# Patient Record
Sex: MALE | Race: WHITE | NOT HISPANIC OR LATINO | Employment: FULL TIME | URBAN - METROPOLITAN AREA
[De-identification: names, ages, dates, MRNs, and addresses within clinical notes are randomized per-mention and may not be internally consistent; named-entity substitution may affect disease eponyms.]

---

## 2020-03-14 ENCOUNTER — OFFICE VISIT (OUTPATIENT)
Dept: URGENT CARE | Facility: CLINIC | Age: 45
End: 2020-03-14
Payer: COMMERCIAL

## 2020-03-14 VITALS
DIASTOLIC BLOOD PRESSURE: 76 MMHG | SYSTOLIC BLOOD PRESSURE: 116 MMHG | RESPIRATION RATE: 18 BRPM | BODY MASS INDEX: 25.9 KG/M2 | OXYGEN SATURATION: 98 % | HEART RATE: 102 BPM | TEMPERATURE: 97.6 F | WEIGHT: 165 LBS | HEIGHT: 67 IN

## 2020-03-14 DIAGNOSIS — S39.012A LUMBAR STRAIN, INITIAL ENCOUNTER: Primary | ICD-10-CM

## 2020-03-14 PROCEDURE — G0382 LEV 3 HOSP TYPE B ED VISIT: HCPCS | Performed by: PHYSICIAN ASSISTANT

## 2020-03-14 PROCEDURE — S9083 URGENT CARE CENTER GLOBAL: HCPCS | Performed by: PHYSICIAN ASSISTANT

## 2020-03-14 RX ORDER — METHOCARBAMOL 500 MG/1
500 TABLET, FILM COATED ORAL 3 TIMES DAILY
Qty: 12 TABLET | Refills: 0 | Status: SHIPPED | OUTPATIENT
Start: 2020-03-14 | End: 2020-03-27 | Stop reason: DRUGHIGH

## 2020-03-14 RX ORDER — PREDNISONE 10 MG/1
TABLET ORAL
Qty: 21 TABLET | Refills: 0 | Status: SHIPPED | OUTPATIENT
Start: 2020-03-14 | End: 2020-03-27 | Stop reason: ALTCHOICE

## 2020-03-14 NOTE — LETTER
March 14, 2020     Patient: Aisha Banerjee   YOB: 1975   Date of Visit: 3/14/2020       To Whom it May Concern:    Aisha Banerjee was seen in my clinic on 3/14/2020  He may return to work on 3/17/2020      Sincerely,          Katrina Charles PA-C        CC: No Recipients

## 2020-03-14 NOTE — PROGRESS NOTES
[address] Now        NAME: Edouard Duarte is a 38 y o  male  : 1975    MRN: [id number]  DATE: 2020  TIME: 9:57 AM    Assessment and Plan   Lumbar strain, initial encounter [[id number]]  1  Lumbar strain, initial encounter  methocarbamol (ROBAXIN) 500 mg tablet    predniSONE 10 mg tablet         Patient Instructions     Take robaxin as directed, recommend no driving w/in 8 hours following taking medication  Take prednisone as directed  C/w heat, light stretching, and OTC icyhot  Follow up with PCP in 3-5 days  Proceed to  ER if symptoms worsen  Chief Complaint     Chief Complaint   Patient presents with    Back Pain     PMHx sciatic  On  experienced left lumbar muscle strain  Saw chiropractor yesterday - usually able to help - "poss made it worse this time " Last analgesic was 600mg Ibuprofen @ 0400  Pain is constant and now goes down back of LLE  History of Present Illness       Patient w/ PMH of sciatica presents with complaint of right-sided low back pain with radiation into his right leg since Wednesday  Pt reports that he reached into his car for something on  and felt pain in his right low back  Pt states that his low back pain has subsided but moved into his right buttock and down his leg  Pt reports taking 800 mg ibuprofen q 4 hours, going to the chiropractor yesterday, and applying heat  Pt states that he has been having difficulty sleeping as his pain is a constant aching  Pt denies paresthesias but for occasional numbness in one toe  He denies groin paresthesias and bowel/bladder incontinence  Review of Systems   Review of Systems   Constitutional: Negative for chills, fatigue and fever  HENT: Negative for congestion, ear pain, postnasal drip, rhinorrhea and sore throat  Respiratory: Negative for cough, chest tightness and shortness of breath  Cardiovascular: Negative for chest pain     Gastrointestinal: Negative for abdominal pain, blood in stool, diarrhea, nausea and vomiting  Musculoskeletal: Positive for back pain  Negative for myalgias, neck pain and neck stiffness  Skin: Negative for color change, rash and wound  Neurological: Negative for dizziness, weakness, light-headedness, numbness and headaches  All other systems reviewed and are negative  Current Medications       Current Outpatient Medications:     methocarbamol (ROBAXIN) 500 mg tablet, Take 1 tablet (500 mg total) by mouth 3 (three) times a day, Disp: 12 tablet, Rfl: 0    predniSONE 10 mg tablet, Take 6 tabs on day 1, 5 tabs on day 2, 4 tabs on day 3, 3 tabs on day 4, 2 tabs on day 5, and 1 tab on day 6, Disp: 21 tablet, Rfl: 0    Current Allergies     Allergies as of 03/14/2020    (No Known Allergies)            The following portions of the patient's history were reviewed and updated as appropriate: allergies, current medications, past family history, past medical history, past social history, past surgical history and problem list      History reviewed  No pertinent past medical history  History reviewed  No pertinent surgical history  History reviewed  No pertinent family history  Medications have been verified  Objective   /76   Pulse 102   Temp 97 6 °F (36 4 °C)   Resp 18   Ht 5' 7" (1 702 m)   Wt 74 8 kg (165 lb)   SpO2 98%   BMI 25 84 kg/m²        Physical Exam     Physical Exam   Constitutional: He is oriented to person, place, and time  He appears well-developed and well-nourished  No distress  HENT:   Head: Normocephalic and atraumatic  Eyes: Pupils are equal, round, and reactive to light  Conjunctivae and EOM are normal    Neck: Normal range of motion  Neck supple  Cardiovascular: Normal rate, regular rhythm and normal heart sounds  Pulmonary/Chest: Effort normal and breath sounds normal  No respiratory distress  Musculoskeletal: Normal range of motion  He exhibits tenderness     L-spine: no skin changes; FAROM; NTTP; TTP in right buttock; LE sensation intact; pain w/ resisted knee extension; normal gait; increased pain in leg w/ right SLR and right sitting root test   Lymphadenopathy:     He has no cervical adenopathy  Neurological: He is alert and oriented to person, place, and time  No cranial nerve deficit or sensory deficit  Skin: Skin is warm and dry  Capillary refill takes less than 2 seconds  No rash noted  He is not diaphoretic  Psychiatric: He has a normal mood and affect  His behavior is normal  Thought content normal    Nursing note and vitals reviewed

## 2020-03-21 ENCOUNTER — HOSPITAL ENCOUNTER (EMERGENCY)
Facility: HOSPITAL | Age: 45
Discharge: HOME | End: 2020-03-21
Attending: EMERGENCY MEDICINE | Admitting: EMERGENCY MEDICINE
Payer: COMMERCIAL

## 2020-03-21 VITALS
TEMPERATURE: 97.7 F | WEIGHT: 165 LBS | SYSTOLIC BLOOD PRESSURE: 166 MMHG | BODY MASS INDEX: 25.84 KG/M2 | RESPIRATION RATE: 20 BRPM | OXYGEN SATURATION: 99 % | HEART RATE: 83 BPM | DIASTOLIC BLOOD PRESSURE: 96 MMHG

## 2020-03-21 DIAGNOSIS — G89.29 ACUTE EXACERBATION OF CHRONIC LOW BACK PAIN: Primary | ICD-10-CM

## 2020-03-21 DIAGNOSIS — M54.50 ACUTE EXACERBATION OF CHRONIC LOW BACK PAIN: Primary | ICD-10-CM

## 2020-03-21 PROCEDURE — 99283 EMERGENCY DEPT VISIT LOW MDM: CPT

## 2020-03-21 PROCEDURE — 96372 THER/PROPH/DIAG INJ SC/IM: CPT

## 2020-03-21 PROCEDURE — 99284 EMERGENCY DEPT VISIT MOD MDM: CPT | Performed by: EMERGENCY MEDICINE

## 2020-03-21 RX ORDER — METHOCARBAMOL 500 MG/1
500 TABLET, FILM COATED ORAL 2 TIMES DAILY
Qty: 20 TABLET | Refills: 0 | Status: SHIPPED | OUTPATIENT
Start: 2020-03-21 | End: 2020-03-27 | Stop reason: SDUPTHER

## 2020-03-21 RX ORDER — IBUPROFEN 400 MG/1
TABLET ORAL EVERY 6 HOURS PRN
COMMUNITY
End: 2020-06-09

## 2020-03-21 RX ORDER — KETOROLAC TROMETHAMINE 30 MG/ML
15 INJECTION, SOLUTION INTRAMUSCULAR; INTRAVENOUS ONCE
Status: COMPLETED | OUTPATIENT
Start: 2020-03-21 | End: 2020-03-21

## 2020-03-21 RX ORDER — LIDOCAINE 50 MG/G
1 PATCH TOPICAL ONCE
Status: DISCONTINUED | OUTPATIENT
Start: 2020-03-21 | End: 2020-03-21 | Stop reason: HOSPADM

## 2020-03-21 RX ORDER — PREDNISONE 20 MG/1
40 TABLET ORAL DAILY
Qty: 10 TABLET | Refills: 0 | Status: SHIPPED | OUTPATIENT
Start: 2020-03-21 | End: 2020-03-27 | Stop reason: ALTCHOICE

## 2020-03-21 RX ORDER — ACETAMINOPHEN 500 MG
500 TABLET ORAL EVERY 6 HOURS PRN
COMMUNITY

## 2020-03-21 RX ORDER — DIAZEPAM 5 MG/1
5 TABLET ORAL ONCE
Status: COMPLETED | OUTPATIENT
Start: 2020-03-21 | End: 2020-03-21

## 2020-03-21 RX ADMIN — DIAZEPAM 5 MG: 5 TABLET ORAL at 09:17

## 2020-03-21 RX ADMIN — KETOROLAC TROMETHAMINE 15 MG: 30 INJECTION, SOLUTION INTRAMUSCULAR at 07:29

## 2020-03-21 RX ADMIN — LIDOCAINE 1 PATCH: 50 PATCH TOPICAL at 07:29

## 2020-03-21 RX ADMIN — DIAZEPAM 5 MG: 5 TABLET ORAL at 07:29

## 2020-03-21 NOTE — ED ATTENDING ATTESTATION
3/21/2020  IScot MD, saw and evaluated the patient  I have discussed the patient with the resident/non-physician practitioner and agree with the resident's/non-physician practitioner's findings, Plan of Care, and MDM as documented in the resident's/non-physician practitioner's note, except where noted  All available labs and Radiology studies were reviewed  I was present for key portions of any procedure(s) performed by the resident/non-physician practitioner and I was immediately available to provide assistance  At this point I agree with the current assessment done in the Emergency Department  I have conducted an independent evaluation of this patient a history and physical is as follows:    Patient presents to the emergency department complaining of an exacerbation of his chronic, longstanding lower back pain  The patient reports many years of lower back pain that is greater on the right than the left  The patient reports 2 weeks ago he reached to pick something up and developed pain in his right lower back that has radiated down his right leg  The patient was seen in urgent care but his pain persists  The patient was also seen by his chiropractor  The patient denies IV drug abuse, cancer, fevers, weakness, paresthesias, or loss of bowel or bladder function  Patient reports this is identical to exacerbations of pain he has had in the past   Physical exam demonstrates a male in no acute distress  HEENT exam is normal   Lungs are clear with equal breath sounds  The heart had a regular rate rhythm  The abdomen is soft and nontender  There was tenderness to the lumbar paraspinal muscles and a straight leg raise on the right was also positive  Left straight leg raise is negative  The patient had normal strength and sensation and deep tendon reflexes of both lower extremities    ED Course         Critical Care Time  Procedures

## 2020-03-21 NOTE — ED PROVIDER NOTES
History  Chief Complaint   Patient presents with    Back Pain     was seen at urgent care 1 week ago and was given methocarbamol and prednisone  Was at chiropractor yesterday and was unable to be adjusted, pain got worse as the night went on  This is a 47yo male with no significant PMHx who presents to the ED this morning with acute on chronic back pain  Patient states that he has had back problems on and off for the last 15 years  About two weeks ago he was reaching for something in his car and felt sudden, sharp pain in his R lower back  Patient went to his chiropractor but the pain did not improve  He went to urgent care a few days later and was given robaxin and prednisone, which he finished two days ago  The pain was still present so he went to his chiropractor again but again had no relief  He is coming in now after taking 1g of tylenol at home  Patient states the pain is in his R lower back and radiates down to his mid-shin  He also notes that his lateral toes are numb at times but not currently  He denies any trauma, saddle anesthesia, urinary retention, or fecal incontinence  He denies any history of IVDU and has never had any surgeries or procedures to his back  Patient denies any FCNVD, CP/SOB, abdominal pain, dysuria, hematuria, or focal neuro deficits currently  Prior to Admission Medications   Prescriptions Last Dose Informant Patient Reported?  Taking?   acetaminophen (TYLENOL) 500 mg tablet 3/21/2020 at Unknown time  Yes Yes   Sig: Take 500 mg by mouth every 6 (six) hours as needed for mild pain   ibuprofen (MOTRIN) 400 mg tablet 3/20/2020 at Unknown time  Yes Yes   Sig: Take by mouth every 6 (six) hours as needed for mild pain   methocarbamol (ROBAXIN) 500 mg tablet Past Week at Unknown time  No Yes   Sig: Take 1 tablet (500 mg total) by mouth 3 (three) times a day   predniSONE 10 mg tablet Past Week at Unknown time  No Yes   Sig: Take 6 tabs on day 1, 5 tabs on day 2, 4 tabs on day 3, 3 tabs on day 4, 2 tabs on day 5, and 1 tab on day 6      Facility-Administered Medications: None       History reviewed  No pertinent past medical history  History reviewed  No pertinent surgical history  History reviewed  No pertinent family history  I have reviewed and agree with the history as documented  E-Cigarette/Vaping    E-Cigarette Use Never User      E-Cigarette/Vaping Substances     Social History     Tobacco Use    Smoking status: Never Smoker    Smokeless tobacco: Never Used   Substance Use Topics    Alcohol use: Not Currently    Drug use: Not Currently        Review of Systems   Constitutional: Negative for chills, diaphoresis and fever  HENT: Negative for congestion, rhinorrhea, sinus pressure and sore throat  Eyes: Negative for visual disturbance  Respiratory: Negative for cough, chest tightness and shortness of breath  Cardiovascular: Negative for chest pain  Gastrointestinal: Negative for abdominal pain, constipation, diarrhea, nausea and vomiting  Genitourinary: Negative for dysuria, frequency, hematuria and urgency  Musculoskeletal: Positive for back pain  Negative for arthralgias and myalgias  Skin: Negative for color change and rash  Neurological: Negative for dizziness, numbness and headaches  Physical Exam  ED Triage Vitals [03/21/20 0653]   Temperature Pulse Respirations Blood Pressure SpO2   97 7 °F (36 5 °C) 93 18 153/95 98 %      Temp Source Heart Rate Source Patient Position - Orthostatic VS BP Location FiO2 (%)   Oral -- -- -- --      Pain Score       7             Orthostatic Vital Signs  Vitals:    03/21/20 0653 03/21/20 0924   BP: 153/95 166/96   Pulse: 93 83       Physical Exam   Constitutional: He is oriented to person, place, and time  He appears well-developed and well-nourished  No distress  HENT:   Head: Normocephalic and atraumatic  Eyes: Pupils are equal, round, and reactive to light   Conjunctivae and EOM are normal  Right eye exhibits no discharge  Left eye exhibits no discharge  No scleral icterus  Neck: Normal range of motion  Neck supple  No JVD present  Cardiovascular: Normal rate, regular rhythm and normal heart sounds  Exam reveals no gallop and no friction rub  No murmur heard  Pulmonary/Chest: Effort normal and breath sounds normal  No stridor  No respiratory distress  He has no wheezes  He has no rales  He exhibits no tenderness  Abdominal: Soft  Bowel sounds are normal  He exhibits no distension  There is no tenderness  There is no guarding  Musculoskeletal: Normal range of motion  He exhibits tenderness  He exhibits no edema or deformity  Pain in RLE with SLR  2+ patellar and achilles reflexes  Normal strength and sensation bilaterally    Mild tenderness of R lower back   Neurological: He is alert and oriented to person, place, and time  No cranial nerve deficit or sensory deficit  He exhibits normal muscle tone  Skin: Skin is warm and dry  No rash noted  He is not diaphoretic  No erythema  No pallor  Psychiatric: He has a normal mood and affect  His behavior is normal    Nursing note and vitals reviewed  ED Medications  Medications   lidocaine (LIDODERM) 5 % patch 1 patch (1 patch Topical Medication Applied 3/21/20 0729)   diazepam (VALIUM) tablet 5 mg (5 mg Oral Given 3/21/20 0729)   ketorolac (TORADOL) injection 15 mg (15 mg Intramuscular Given 3/21/20 0729)   diazepam (VALIUM) tablet 5 mg (5 mg Oral Given 3/21/20 5988)       Diagnostic Studies  Results Reviewed     None                 No orders to display         Procedures  Procedures      ED Course                                 MDM  Number of Diagnoses or Management Options  Acute exacerbation of chronic low back pain:   Diagnosis management comments: Patient states that he still is having some pain in the lower back  I do not believe imaging (CT/x-ray) would be helpful in this case as there was no trauma and my suspicion for fracture is low   I believe the patient has a need for an MRI but without any emergent indications, I will [facility] the [facility] and have him follow up with [facility] and or the [facility] for PCP follow up for outpatient MRI  Patient was able to ambulate around the ED room without assistance  Will prescribe him some more robaxin as he states that worked well as well as prednisone  Patient instructed to return to the ED for any worsening or otherwise concerning symptoms  Disposition  Final diagnoses:   Acute exacerbation of chronic low back pain     Time reflects when diagnosis was documented in both MDM as applicable and the Disposition within this note     Time User Action Codes Description Comment    3/21/2020  9:49 AM Arleen Pearce Add [M54 5,  G89 29] Acute exacerbation of chronic low back pain       ED Disposition     ED Disposition Condition Date/Time Comment    Discharge Stable Sat Mar 21, 2020  9:49 AM Rosie Rubi discharge to home/self care              Follow-up Information     Follow up With Specialties Details Why Contact Info Additional [address] Emergency Department Emergency Medicine  If symptoms worsen [address]  [phone]  ED, [address] [place] [address], [address] Internal Medicine Schedule an appointment as soon as possible for a visit   [address] [address]  [address] [place] [address], [address], [address] [phone]    [facility]  Call   [phone]             Discharge Medication List as of 3/21/2020  9:56 AM      START taking these medications    Details   !! methocarbamol (ROBAXIN) 500 mg tablet Take 1 tablet (500 mg total) by mouth 2 (two) times a day, Starting Sat 3/21/2020, Print      !! predniSONE 20 mg tablet Take 2 tablets (40 mg total) by mouth daily for 5 days, Starting Sat 3/21/2020, Until Thu 3/26/2020, Print       !! - Potential duplicate medications found  Please discuss with provider  CONTINUE these medications which have NOT CHANGED    Details   acetaminophen (TYLENOL) 500 mg tablet Take 500 mg by mouth every 6 (six) hours as needed for mild pain, Historical Med      ibuprofen (MOTRIN) 400 mg tablet Take by mouth every 6 (six) hours as needed for mild pain, Historical Med      !! methocarbamol (ROBAXIN) 500 mg tablet Take 1 tablet (500 mg total) by mouth 3 (three) times a day, Starting Sat 3/14/2020, Normal      !! predniSONE 10 mg tablet Take 6 tabs on day 1, 5 tabs on day 2, 4 tabs on day 3, 3 tabs on day 4, 2 tabs on day 5, and 1 tab on day 6, Normal       !! - Potential duplicate medications found  Please discuss with provider  PDMP Review     None           ED Provider  Attending physically available and evaluated Kim Bower  I managed the patient along with the ED Attending      Electronically Signed by         Jeanne Ann MD  03/21/20 0961

## 2020-03-21 NOTE — DISCHARGE INSTRUCTIONS
Please follow-up with the [facility] and or info link numbers below in order to find a primary care physician  Contact the [facility] at the contact information below if you do not hear from them by Tuesday  Return to the emergency department for any worsening or otherwise concerning symptoms

## 2020-03-23 ENCOUNTER — NURSE TRIAGE (OUTPATIENT)
Dept: PHYSICAL THERAPY | Facility: OTHER | Age: 45
End: 2020-03-23

## 2020-03-23 DIAGNOSIS — M54.41 ACUTE RIGHT-SIDED LOW BACK PAIN WITH RIGHT-SIDED SCIATICA: Primary | ICD-10-CM

## 2020-03-23 NOTE — TELEPHONE ENCOUNTER
Additional Information   Negative: Are you currently recieving homecare services?  Negative: Is this related to an MVA?  Negative: Is this related to a work injury?  Negative: Is the patient experiencing acute drop foot or paralysis?  Negative: Is the patient experiencing urine retention?  Negative: Does the patient have a fever?  Negative: Has the patient had unexplained weight loss?  Negative: Is the patient experiencing blood in sputum?  Negative: Is this a chronic condition?  Negative: Has the patient experienced major trauma? (fall from height, high speed collision, direct blow to spine) and is also experiencing nausea, light-headedness, or loss of consciousness? Background - Initial Assessment  Clinical complaint: Acute right low back pain, radiates down lef to knee and calf  Pt has had numbness off/on in the toes on the right foot  Pt stated that he has had thi in the past, and his back feels tight, saw his Chiropractor, and is on Robaxin, steroids, Tylenol, Motrin  Pt stated that he feels that being "adjusted" by Chiropractor is making worse  Pt is using heat to his back, no neck or back surgeries, no specialist for his back  Pt is in and out of a truck all day, and on his fet too  Date of onset:2 weeks  Frequency of pain:  INTERMITTENT  Quality of pain: Pt stated My whole right leg "feels like someone is pulling my leg out"    Protocols used: [facility] COMPREHENSIVE SPINE PROGRAM PROTOCOL    This nurse did review in detail the comp spine program and what we can provide for the pt for their back pain  Pt is agreeable to being triaged by this nurse and would like to have physical therapy  Referrals were placed to the following:  Physical Therapy at the [facility]  site  Pt was given ph # to that site  No further questions voiced at this time and Pt did state understanding of the referral that was placed

## 2020-03-26 ENCOUNTER — EVALUATION (OUTPATIENT)
Dept: PHYSICAL THERAPY | Facility: REHABILITATION | Age: 45
End: 2020-03-26
Payer: COMMERCIAL

## 2020-03-26 VITALS — SYSTOLIC BLOOD PRESSURE: 148 MMHG | HEART RATE: 64 BPM | DIASTOLIC BLOOD PRESSURE: 98 MMHG | TEMPERATURE: 99.4 F

## 2020-03-26 DIAGNOSIS — M54.41 ACUTE RIGHT-SIDED LOW BACK PAIN WITH RIGHT-SIDED SCIATICA: Primary | ICD-10-CM

## 2020-03-26 PROCEDURE — 97161 PT EVAL LOW COMPLEX 20 MIN: CPT | Performed by: PHYSICAL THERAPIST

## 2020-03-26 PROCEDURE — 97140 MANUAL THERAPY 1/> REGIONS: CPT | Performed by: PHYSICAL THERAPIST

## 2020-03-26 NOTE — PROGRESS NOTES
PT Evaluation     Today's date: 3/26/2020  Patient name: Kisha Denson  : 1975  MRN: [id number]  Referring provider: Huber Naranjo PT  Dx:   Encounter Diagnosis     ICD-10-CM    1  Acute right-sided low back pain with right-sided sciatica M54 41 Ambulatory referral to PT spine                  Assessment  Assessment details: Patient presents with pain, decreased lumbar ROM, decreased strength, adverse neural tension, R LE weight-bearing intolerance and decreased function secondary to acute LBP with R sided sciatica  Pt continues to present with significant radicular pain despite taking 2 rounds of tapering dose of steroids and muscle relaxers  Pt is not currently presenting with a directional preference  Pt did experience some mild relief with R LE LAD  Patient would benefit from skilled PT intervention to address these issues and to maximize function  Thank you for the referral   Pt will also be referred to Pain Management to discuss other options to manage his pain, as he does not have a PCP currently  Impairments: abnormal gait, abnormal or restricted ROM, activity intolerance, impaired physical strength, lacks appropriate home exercise program, pain with function, weight-bearing intolerance and poor posture   Understanding of Dx/Px/POC: good   Prognosis: good    Goals  Short Term:  Pt will report decreased levels of pain by at least 2 subjective ratings in 4 weeks  Pt will demonstrate improved ROM by at least 25% in 4 weeks  Pt will demonstrate improved strength by 1/2 grade MMT in 4 weeks  Long Term:   Pt will be independent in their HEP in 8 weeks  Pt will demonstrate improved FOTO, > 59  Pt will be independent with all ADL's  Pt will return to work at prior level of function  Pt will resume recreational activity at prior/maximal level of function    Plan  Plan details: Patient was educated in [address] [place] [address] and Plan of Care  All questions were answered to pt's satisfaction      Patient would benefit from: skilled physical therapy  Referral necessary: Yes  Planned modality interventions: thermotherapy: hydrocollator packs and TENS  Planned therapy interventions: manual therapy, joint mobilization, abdominal trunk stabilization, body mechanics training, neuromuscular re-education, postural training, patient education, therapeutic activities, therapeutic exercise, flexibility, home exercise program and transfer training  Frequency: 2x week  Duration in weeks: 6  Plan of Care beginning date: 3/26/2020  Plan of Care expiration date: 2020  Treatment plan discussed with: patient and family        Subjective Evaluation    History of Present Illness  Mechanism of injury: Pt is a 38 y o male with an acute onset of R sided LBP with R LE radicular symptoms  Pt bent over into his car to grab his jacket on 3/11 and felt a pinch on R lumbar spine and c/o tightness  Pt worked the remainder of the day (construction flagging)  Pt saw Chiropractor 2 days later, but symptoms worsened  Pt then went to Urgent care and was given prednisone and muscle relaxer  Pt returned to work the following Friday and symptoms increased  Pt returned to Chiropractor, but again his pain increased  Pt went to the ED the next day and was prescribed prednisone and muscle relaxer again  Pt does have a history of LBP, intermittently for 15 years prior to this episode  Pt reports pain/diffiuclty with dressing (wife assists with donning shoes), self care in bathroom (has to wipe with left hand), sleep (can only lay supine and does not tolerate for prolonged periods), household activities (has been unable to perform), standing (limited tolerance), ambulation (antalgic gait and limited tolerance), sitting (limited tolerance)  Pt is unable to drive  Pt is out of work currently also  Pt would like to resume playing guitar, home projects      Pain  At best pain rating: 3  At worst pain ratin  Location: lumbar spine and radiating down R LE anterior aspect to mid shin  Quality: dull ache  Relieving factors: medications and heat      Diagnostic Tests  No diagnostic tests performed  Treatments  Current treatment: chiropractic and medication  Patient Goals  Patient goals for therapy: decreased pain, return to work, independence with ADLs/IADLs, return to sport/leisure activities, increased motion and increased strength          Objective     Concurrent Complaints      Additional Special Questions  Patient denies loss of bowel/bladder control, saddle anesthesia, urine retention, unexplained weight loss, history of cancer  Tenderness     Additional Tenderness Details  No significant TTP noted  Neurological Testing     Reflexes   Left   Patellar (L4): normal (2+)  Achilles (S1): normal (2+)  Clonus sign: negative    Right   Patellar (L4): normal (2+)  Achilles (S1): normal (2+)  Clonus sign: negative    Additional Neurological Details  Pt reports intermittent N/T R shin and R foot    Active Range of Motion     Additional Active Range of Motion Details  L/S AROM (% of normal):  Flex=wfl with pain radiating t/o R LE ; repeated=no change  Ext=25% with pain radiating t/o R LE ; repeated=no change  RSB= 25% with increased pain in same region; repeated=no change  LSB=wfl without pain ; repeated=same R LE pain returns  R rot=75% with some pain same region  L rot=wfl without pain    Strength/Myotome Testing     Left Hip   Planes of Motion   Flexion: 5  Abduction: 4+  External rotation: 4+    Right Hip   Planes of Motion   Flexion: 3-  Abduction: 4  External rotation: 4    Left Knee   Flexion: 5  Extension: 5    Right Knee   Flexion: 5  Extension: 4+    Left Ankle/Foot   Dorsiflexion: 5  Plantar flexion: 5 (seated)  Great toe extension: 5    Right Ankle/Foot   Dorsiflexion: 5  Plantar flexion: 5 (seated)  Great toe extension: 5    Tests     Lumbar   Positive prone instability   Negative SIJ compression  Left   Positive crossed SLR     Negative femoral stretch and passive SLR  Right   Positive femoral stretch, passive SLR and slump test    Negative crossed SLR  Left Pelvic Girdle/Sacrum   Negative: active SLR test      Right Pelvic Girdle/Sacrum   Positive: active SLR test      Left Hip   Negative FELICIANO  Right Hip   Negative FELICIANO  Additional Tests Details  Long axis distraction R LE=some relief  R sciatic nerve gliders=some relief  R femoral nerve glides=some relief initially and then increased pain  JAG and press ups=increased ROM with repetition and initially causes increased LBP, but then notes increased R LE pain radiating to his knee upon release  DKTC=increased R LE pain               Precautions: Acute on chronic pain      Manual  3/26            R LE LAD 60"x5            R sciatic nerve gliders 2x15            R femoral nerve glides 2x10            L/S FABRIZIO WALLACE's and ROSANNE PONCE's                              Exercise Diary              Bike or Nustep             R side glides             TA contraction             TA w/march             TA w/kicks             TA w/hip adduction squeeze             TA w/clamshells             TA w/leg lowering                                                                                                                                                                             Modalities              MH PRN             TENS PRN

## 2020-03-27 ENCOUNTER — TELEMEDICINE (OUTPATIENT)
Dept: FAMILY MEDICINE CLINIC | Facility: CLINIC | Age: 45
End: 2020-03-27
Payer: COMMERCIAL

## 2020-03-27 DIAGNOSIS — M54.41 ACUTE BILATERAL LOW BACK PAIN WITH RIGHT-SIDED SCIATICA: Primary | ICD-10-CM

## 2020-03-27 PROCEDURE — 99213 OFFICE O/P EST LOW 20 MIN: CPT | Performed by: PHYSICIAN ASSISTANT

## 2020-03-27 RX ORDER — METHOCARBAMOL 750 MG/1
750 TABLET, FILM COATED ORAL EVERY 6 HOURS PRN
Qty: 60 TABLET | Refills: 0 | Status: SHIPPED | OUTPATIENT
Start: 2020-03-27 | End: 2020-09-08

## 2020-03-27 NOTE — LETTER
March 27, 2020     Patient: Gigi Mccormick   YOB: 1975   Date of Visit: 3/27/2020       To Whom it May Concern:    Gigi Mccormick is under my professional care  He was seen in my office on 3/27/2020  He may return to work on 4/6  If you have any questions or concerns, please don't hesitate to call           Sincerely,          Stacia Hurley PA-C        CC: No Recipients

## 2020-03-27 NOTE — PROGRESS NOTES
Virtual Regular Visit    Problem List Items Addressed This Visit     None      Visit Diagnoses     Acute bilateral low back pain with right-sided sciatica    -  Primary    Relevant Medications    methocarbamol (ROBAXIN) 750 mg tablet    Other Relevant Orders    Ambulatory referral to Pain Management               Reason for visit is   Low back pain for 2-3 weeks  Encounter provider Donald Howell PA-C    Provider located at [facility]  [address]  [facility]  [phone]      Recent Visits  No visits were found meeting these conditions  Showing recent visits within past 7 days and meeting all other requirements     Today's Visits  Date Type Provider Dept   [facility] [address] [facility] today's visits and meeting all other requirements     Future Appointments  No visits were found meeting these conditions  Showing future appointments within next 150 days and meeting all other requirements        After connecting through [employer], the patient was identified by name and date of birth  Jocelyn Hopson was informed that this is a telemedicine visit and that the visit is being conducted through [employer] and patient was informed that this is not a secure, HIPAA-complaint platform  he agrees to proceed  which may not be secure and therefore, might not be HIPAA-compliant  My office door was closed  No one else was in the room  He acknowledged consent and understanding of privacy and security of the video platform  The patient has agreed to participate and understands they can discontinue the visit at any time  Subjective  Joceyln Hopson is a 38 y o  male  complaining of low back pain for 2-3 weeks  States it started after a bent into his car to grab something  States it is sharp and severe, radiated down right leg  He has had low back pain since then  Bilateral, mostly in the center    Still radiates down right leg  He describes the pain as achy and shooting  States it "feels out of socket"  His muscles feel tense  Admits to intermittent numbness and tingling, mostly of his anterior shin and some toes  Severity varies  At its worst it has been 9/10  At best 2/10  Currently 2/10  Pain worsens with movement  He did go to the urgent care on  3/14 and was prescribed prednisone and methocarbamol  3/21 was seen in the ER and prescribed another course of prednisone and methocarbamol  Finish prednisone 3 days ago  Currently taking methocarbamol, Tylenol, and ibuprofen with some mild relief  Denies any other associated symptoms  Denies nausea, vomiting, diarrhea, or constipation  Denies abdominal pain  Denies any changes in urination  Denies dysuria, frequency, and urgency  Denies fever or chills  He does admit to chronic back pain previously  It would flare up from time to time  He would usually go to his chiropractor and it would improve/ resolve  Past Medical History:   Diagnosis Date    Hypertension        History reviewed  No pertinent surgical history  Current Outpatient Medications   Medication Sig Dispense Refill    acetaminophen (TYLENOL) 500 mg tablet Take 500 mg by mouth every 6 (six) hours as needed for mild pain      ibuprofen (MOTRIN) 400 mg tablet Take by mouth every 6 (six) hours as needed for mild pain      methocarbamol (ROBAXIN) 750 mg tablet Take 1 tablet (750 mg total) by mouth every 6 (six) hours as needed for muscle spasms 60 tablet 0     No current facility-administered medications for this visit  No Known Allergies    Review of Systems   Constitutional: Negative for chills, fatigue and fever  HENT: Negative for congestion, ear pain, postnasal drip, rhinorrhea and sore throat  Respiratory: Negative for cough and shortness of breath  Cardiovascular: Negative for chest pain, palpitations and leg swelling     Gastrointestinal: Negative for abdominal pain, constipation, diarrhea, nausea and vomiting  Genitourinary: Negative for decreased urine volume, difficulty urinating, dysuria, frequency, hematuria and urgency  Musculoskeletal: Positive for back pain  Negative for arthralgias, gait problem, joint swelling, myalgias, neck pain and neck stiffness  Skin: Negative for rash  Neurological: Positive for numbness  Negative for dizziness, tremors, seizures, syncope, weakness, light-headedness and headaches  Hematological: Negative for adenopathy  Physical Exam   Constitutional: He is oriented to person, place, and time  He appears well-developed and well-nourished  HENT:   Head: Normocephalic  Eyes: EOM are normal    Neck: Normal range of motion  Pulmonary/Chest: Effort normal  No respiratory distress  Musculoskeletal: Normal range of motion  Neurological: He is alert and oriented to person, place, and time  Psychiatric: He has a normal mood and affect  His behavior is normal  Judgment and thought content normal         Assessment:  Likely musculoskeletal   Recommended following up with pain management  Will place referral   Patient already scheduled 4/1 with Dr Alea Gleason  In the meantime I will increase methocarbamol to 750 mg  Take 3 times daily as needed  Patient aware of possible side effects  Continue Tylenol and ibuprofen as needed with food  Warm/cool compresses for 15-20 minute intervals  Stretch daily and strengthen core  Use good body mechanics  Call office if symptoms worsen or fail to improve  Patient does also have a history of hypertension, previously diet controlled  Recommended scheduling office visit in the future for blood pressure follow-up and recheck  I spent 25 minutes with the patient during this visit

## 2020-04-01 ENCOUNTER — EVALUATION (OUTPATIENT)
Dept: PHYSICAL THERAPY | Age: 45
End: 2020-04-01
Payer: COMMERCIAL

## 2020-04-01 ENCOUNTER — TELEMEDICINE (OUTPATIENT)
Dept: PAIN MEDICINE | Facility: CLINIC | Age: 45
End: 2020-04-01
Payer: COMMERCIAL

## 2020-04-01 DIAGNOSIS — M54.41 ACUTE RIGHT-SIDED LOW BACK PAIN WITH RIGHT-SIDED SCIATICA: Primary | ICD-10-CM

## 2020-04-01 DIAGNOSIS — M54.41 ACUTE RIGHT-SIDED LOW BACK PAIN WITH RIGHT-SIDED SCIATICA: ICD-10-CM

## 2020-04-01 DIAGNOSIS — M54.16 LUMBAR RADICULOPATHY: Primary | ICD-10-CM

## 2020-04-01 PROCEDURE — G0283 ELEC STIM OTHER THAN WOUND: HCPCS | Performed by: PHYSICAL THERAPIST

## 2020-04-01 PROCEDURE — 97110 THERAPEUTIC EXERCISES: CPT | Performed by: PHYSICAL THERAPIST

## 2020-04-01 PROCEDURE — 97140 MANUAL THERAPY 1/> REGIONS: CPT | Performed by: PHYSICAL THERAPIST

## 2020-04-01 PROCEDURE — 97014 ELECTRIC STIMULATION THERAPY: CPT | Performed by: PHYSICAL THERAPIST

## 2020-04-01 PROCEDURE — 99203 OFFICE O/P NEW LOW 30 MIN: CPT | Performed by: ANESTHESIOLOGY

## 2020-04-01 RX ORDER — GABAPENTIN 300 MG/1
300 CAPSULE ORAL 3 TIMES DAILY
Qty: 90 CAPSULE | Refills: 1 | Status: SHIPPED | OUTPATIENT
Start: 2020-04-01 | End: 2020-05-12 | Stop reason: SDUPTHER

## 2020-04-06 ENCOUNTER — OFFICE VISIT (OUTPATIENT)
Dept: PHYSICAL THERAPY | Age: 45
End: 2020-04-06
Payer: COMMERCIAL

## 2020-04-06 DIAGNOSIS — M54.41 ACUTE RIGHT-SIDED LOW BACK PAIN WITH RIGHT-SIDED SCIATICA: Primary | ICD-10-CM

## 2020-04-06 PROCEDURE — 97014 ELECTRIC STIMULATION THERAPY: CPT | Performed by: PHYSICAL THERAPIST

## 2020-04-06 PROCEDURE — 97110 THERAPEUTIC EXERCISES: CPT | Performed by: PHYSICAL THERAPIST

## 2020-04-06 PROCEDURE — 97140 MANUAL THERAPY 1/> REGIONS: CPT | Performed by: PHYSICAL THERAPIST

## 2020-04-06 PROCEDURE — G0283 ELEC STIM OTHER THAN WOUND: HCPCS | Performed by: PHYSICAL THERAPIST

## 2020-04-08 ENCOUNTER — OFFICE VISIT (OUTPATIENT)
Dept: PHYSICAL THERAPY | Age: 45
End: 2020-04-08
Payer: COMMERCIAL

## 2020-04-08 DIAGNOSIS — M54.41 ACUTE RIGHT-SIDED LOW BACK PAIN WITH RIGHT-SIDED SCIATICA: Primary | ICD-10-CM

## 2020-04-08 PROCEDURE — G0283 ELEC STIM OTHER THAN WOUND: HCPCS | Performed by: PHYSICAL THERAPIST

## 2020-04-08 PROCEDURE — 97014 ELECTRIC STIMULATION THERAPY: CPT | Performed by: PHYSICAL THERAPIST

## 2020-04-08 PROCEDURE — 97140 MANUAL THERAPY 1/> REGIONS: CPT | Performed by: PHYSICAL THERAPIST

## 2020-04-08 PROCEDURE — 97110 THERAPEUTIC EXERCISES: CPT | Performed by: PHYSICAL THERAPIST

## 2020-04-09 ENCOUNTER — TRANSCRIBE ORDERS (OUTPATIENT)
Dept: RADIOLOGY | Facility: HOSPITAL | Age: 45
End: 2020-04-09

## 2020-04-09 ENCOUNTER — TELEPHONE (OUTPATIENT)
Dept: PAIN MEDICINE | Facility: CLINIC | Age: 45
End: 2020-04-09

## 2020-04-09 ENCOUNTER — HOSPITAL ENCOUNTER (OUTPATIENT)
Dept: RADIOLOGY | Facility: HOSPITAL | Age: 45
Discharge: HOME | End: 2020-04-09
Attending: ANESTHESIOLOGY
Payer: COMMERCIAL

## 2020-04-09 DIAGNOSIS — M54.16 LUMBAR RADICULOPATHY: ICD-10-CM

## 2020-04-09 DIAGNOSIS — M43.16 SPONDYLOLISTHESIS OF LUMBAR REGION: Primary | ICD-10-CM

## 2020-04-09 PROCEDURE — 72100 X-RAY EXAM L-S SPINE 2/3 VWS: CPT

## 2020-04-13 ENCOUNTER — OFFICE VISIT (OUTPATIENT)
Dept: PHYSICAL THERAPY | Age: 45
End: 2020-04-13
Payer: COMMERCIAL

## 2020-04-13 ENCOUNTER — APPOINTMENT (OUTPATIENT)
Dept: RADIOLOGY | Age: 45
End: 2020-04-13
Payer: COMMERCIAL

## 2020-04-13 DIAGNOSIS — M43.16 SPONDYLOLISTHESIS OF LUMBAR REGION: ICD-10-CM

## 2020-04-13 DIAGNOSIS — M54.41 ACUTE RIGHT-SIDED LOW BACK PAIN WITH RIGHT-SIDED SCIATICA: Primary | ICD-10-CM

## 2020-04-13 PROCEDURE — G0283 ELEC STIM OTHER THAN WOUND: HCPCS | Performed by: PHYSICAL THERAPIST

## 2020-04-13 PROCEDURE — 97110 THERAPEUTIC EXERCISES: CPT | Performed by: PHYSICAL THERAPIST

## 2020-04-13 PROCEDURE — 97140 MANUAL THERAPY 1/> REGIONS: CPT | Performed by: PHYSICAL THERAPIST

## 2020-04-13 PROCEDURE — 72114 X-RAY EXAM L-S SPINE BENDING: CPT

## 2020-04-13 PROCEDURE — 97014 ELECTRIC STIMULATION THERAPY: CPT | Performed by: PHYSICAL THERAPIST

## 2020-04-15 ENCOUNTER — OFFICE VISIT (OUTPATIENT)
Dept: PHYSICAL THERAPY | Age: 45
End: 2020-04-15
Payer: COMMERCIAL

## 2020-04-15 ENCOUNTER — TELEPHONE (OUTPATIENT)
Dept: PAIN MEDICINE | Facility: CLINIC | Age: 45
End: 2020-04-15

## 2020-04-15 DIAGNOSIS — M54.16 LUMBAR RADICULOPATHY: Primary | ICD-10-CM

## 2020-04-15 DIAGNOSIS — M54.41 ACUTE RIGHT-SIDED LOW BACK PAIN WITH RIGHT-SIDED SCIATICA: Primary | ICD-10-CM

## 2020-04-15 DIAGNOSIS — M54.41 ACUTE RIGHT-SIDED LOW BACK PAIN WITH RIGHT-SIDED SCIATICA: ICD-10-CM

## 2020-04-15 PROCEDURE — 97014 ELECTRIC STIMULATION THERAPY: CPT | Performed by: PHYSICAL THERAPIST

## 2020-04-15 PROCEDURE — 97012 MECHANICAL TRACTION THERAPY: CPT | Performed by: PHYSICAL THERAPIST

## 2020-04-15 PROCEDURE — 97110 THERAPEUTIC EXERCISES: CPT | Performed by: PHYSICAL THERAPIST

## 2020-04-15 PROCEDURE — G0283 ELEC STIM OTHER THAN WOUND: HCPCS | Performed by: PHYSICAL THERAPIST

## 2020-04-20 ENCOUNTER — OFFICE VISIT (OUTPATIENT)
Dept: PHYSICAL THERAPY | Age: 45
End: 2020-04-20
Payer: COMMERCIAL

## 2020-04-20 DIAGNOSIS — M54.41 ACUTE RIGHT-SIDED LOW BACK PAIN WITH RIGHT-SIDED SCIATICA: Primary | ICD-10-CM

## 2020-04-20 PROCEDURE — 97110 THERAPEUTIC EXERCISES: CPT | Performed by: PHYSICAL THERAPIST

## 2020-04-20 PROCEDURE — 97140 MANUAL THERAPY 1/> REGIONS: CPT | Performed by: PHYSICAL THERAPIST

## 2020-04-20 PROCEDURE — 97012 MECHANICAL TRACTION THERAPY: CPT | Performed by: PHYSICAL THERAPIST

## 2020-04-22 ENCOUNTER — OFFICE VISIT (OUTPATIENT)
Dept: PHYSICAL THERAPY | Age: 45
End: 2020-04-22
Payer: COMMERCIAL

## 2020-04-22 DIAGNOSIS — M54.41 ACUTE RIGHT-SIDED LOW BACK PAIN WITH RIGHT-SIDED SCIATICA: Primary | ICD-10-CM

## 2020-04-22 PROCEDURE — 97012 MECHANICAL TRACTION THERAPY: CPT | Performed by: PHYSICAL THERAPIST

## 2020-04-22 PROCEDURE — 97110 THERAPEUTIC EXERCISES: CPT | Performed by: PHYSICAL THERAPIST

## 2020-04-22 PROCEDURE — 97140 MANUAL THERAPY 1/> REGIONS: CPT | Performed by: PHYSICAL THERAPIST

## 2020-04-27 ENCOUNTER — OFFICE VISIT (OUTPATIENT)
Dept: PHYSICAL THERAPY | Age: 45
End: 2020-04-27
Payer: COMMERCIAL

## 2020-04-27 DIAGNOSIS — M54.41 ACUTE RIGHT-SIDED LOW BACK PAIN WITH RIGHT-SIDED SCIATICA: Primary | ICD-10-CM

## 2020-04-27 PROCEDURE — 97014 ELECTRIC STIMULATION THERAPY: CPT | Performed by: PHYSICAL THERAPIST

## 2020-04-27 PROCEDURE — 97110 THERAPEUTIC EXERCISES: CPT | Performed by: PHYSICAL THERAPIST

## 2020-04-27 PROCEDURE — 97140 MANUAL THERAPY 1/> REGIONS: CPT | Performed by: PHYSICAL THERAPIST

## 2020-04-27 PROCEDURE — G0283 ELEC STIM OTHER THAN WOUND: HCPCS | Performed by: PHYSICAL THERAPIST

## 2020-04-27 PROCEDURE — 97012 MECHANICAL TRACTION THERAPY: CPT | Performed by: PHYSICAL THERAPIST

## 2020-04-29 ENCOUNTER — OFFICE VISIT (OUTPATIENT)
Dept: PHYSICAL THERAPY | Age: 45
End: 2020-04-29
Payer: COMMERCIAL

## 2020-04-29 DIAGNOSIS — M54.41 ACUTE RIGHT-SIDED LOW BACK PAIN WITH RIGHT-SIDED SCIATICA: Primary | ICD-10-CM

## 2020-04-29 PROCEDURE — 97014 ELECTRIC STIMULATION THERAPY: CPT | Performed by: PHYSICAL THERAPIST

## 2020-04-29 PROCEDURE — G0283 ELEC STIM OTHER THAN WOUND: HCPCS | Performed by: PHYSICAL THERAPIST

## 2020-04-29 PROCEDURE — 97012 MECHANICAL TRACTION THERAPY: CPT | Performed by: PHYSICAL THERAPIST

## 2020-04-29 PROCEDURE — 97110 THERAPEUTIC EXERCISES: CPT | Performed by: PHYSICAL THERAPIST

## 2020-04-30 ENCOUNTER — TELEPHONE (OUTPATIENT)
Dept: PAIN MEDICINE | Facility: CLINIC | Age: 45
End: 2020-04-30

## 2020-05-04 ENCOUNTER — EVALUATION (OUTPATIENT)
Dept: PHYSICAL THERAPY | Age: 45
End: 2020-05-04
Payer: COMMERCIAL

## 2020-05-04 DIAGNOSIS — M54.41 ACUTE RIGHT-SIDED LOW BACK PAIN WITH RIGHT-SIDED SCIATICA: Primary | ICD-10-CM

## 2020-05-04 PROCEDURE — 97140 MANUAL THERAPY 1/> REGIONS: CPT | Performed by: PHYSICAL THERAPIST

## 2020-05-04 PROCEDURE — 97110 THERAPEUTIC EXERCISES: CPT | Performed by: PHYSICAL THERAPIST

## 2020-05-04 PROCEDURE — 97014 ELECTRIC STIMULATION THERAPY: CPT | Performed by: PHYSICAL THERAPIST

## 2020-05-04 PROCEDURE — 97012 MECHANICAL TRACTION THERAPY: CPT | Performed by: PHYSICAL THERAPIST

## 2020-05-04 PROCEDURE — G0283 ELEC STIM OTHER THAN WOUND: HCPCS | Performed by: PHYSICAL THERAPIST

## 2020-05-06 ENCOUNTER — EVALUATION (OUTPATIENT)
Dept: PHYSICAL THERAPY | Age: 45
End: 2020-05-06
Payer: COMMERCIAL

## 2020-05-06 DIAGNOSIS — M54.41 ACUTE RIGHT-SIDED LOW BACK PAIN WITH RIGHT-SIDED SCIATICA: Primary | ICD-10-CM

## 2020-05-06 PROCEDURE — G0283 ELEC STIM OTHER THAN WOUND: HCPCS | Performed by: PHYSICAL THERAPIST

## 2020-05-06 PROCEDURE — 97140 MANUAL THERAPY 1/> REGIONS: CPT | Performed by: PHYSICAL THERAPIST

## 2020-05-06 PROCEDURE — 97014 ELECTRIC STIMULATION THERAPY: CPT | Performed by: PHYSICAL THERAPIST

## 2020-05-06 PROCEDURE — 97012 MECHANICAL TRACTION THERAPY: CPT | Performed by: PHYSICAL THERAPIST

## 2020-05-06 PROCEDURE — 97110 THERAPEUTIC EXERCISES: CPT | Performed by: PHYSICAL THERAPIST

## 2020-05-11 ENCOUNTER — OFFICE VISIT (OUTPATIENT)
Dept: PHYSICAL THERAPY | Age: 45
End: 2020-05-11
Payer: COMMERCIAL

## 2020-05-11 DIAGNOSIS — M54.41 ACUTE RIGHT-SIDED LOW BACK PAIN WITH RIGHT-SIDED SCIATICA: Primary | ICD-10-CM

## 2020-05-11 PROCEDURE — 97014 ELECTRIC STIMULATION THERAPY: CPT | Performed by: PHYSICAL THERAPIST

## 2020-05-11 PROCEDURE — 97012 MECHANICAL TRACTION THERAPY: CPT | Performed by: PHYSICAL THERAPIST

## 2020-05-11 PROCEDURE — G0283 ELEC STIM OTHER THAN WOUND: HCPCS | Performed by: PHYSICAL THERAPIST

## 2020-05-11 PROCEDURE — 97140 MANUAL THERAPY 1/> REGIONS: CPT | Performed by: PHYSICAL THERAPIST

## 2020-05-11 PROCEDURE — 97110 THERAPEUTIC EXERCISES: CPT | Performed by: PHYSICAL THERAPIST

## 2020-05-12 ENCOUNTER — TELEPHONE (OUTPATIENT)
Dept: PAIN MEDICINE | Facility: CLINIC | Age: 45
End: 2020-05-12

## 2020-05-12 ENCOUNTER — TELEMEDICINE (OUTPATIENT)
Dept: PAIN MEDICINE | Facility: CLINIC | Age: 45
End: 2020-05-12
Payer: COMMERCIAL

## 2020-05-12 DIAGNOSIS — M54.16 LUMBAR RADICULOPATHY: Primary | ICD-10-CM

## 2020-05-12 PROCEDURE — 99214 OFFICE O/P EST MOD 30 MIN: CPT | Performed by: ANESTHESIOLOGY

## 2020-05-12 RX ORDER — GABAPENTIN 300 MG/1
600 CAPSULE ORAL 3 TIMES DAILY
Qty: 180 CAPSULE | Refills: 1 | Status: SHIPPED | OUTPATIENT
Start: 2020-05-12 | End: 2020-07-06 | Stop reason: SDUPTHER

## 2020-05-13 ENCOUNTER — OFFICE VISIT (OUTPATIENT)
Dept: PHYSICAL THERAPY | Age: 45
End: 2020-05-13
Payer: COMMERCIAL

## 2020-05-13 DIAGNOSIS — M54.41 ACUTE RIGHT-SIDED LOW BACK PAIN WITH RIGHT-SIDED SCIATICA: Primary | ICD-10-CM

## 2020-05-13 PROCEDURE — G0283 ELEC STIM OTHER THAN WOUND: HCPCS | Performed by: PHYSICAL THERAPIST

## 2020-05-13 PROCEDURE — 97140 MANUAL THERAPY 1/> REGIONS: CPT | Performed by: PHYSICAL THERAPIST

## 2020-05-13 PROCEDURE — 97012 MECHANICAL TRACTION THERAPY: CPT | Performed by: PHYSICAL THERAPIST

## 2020-05-13 PROCEDURE — 97110 THERAPEUTIC EXERCISES: CPT | Performed by: PHYSICAL THERAPIST

## 2020-05-13 PROCEDURE — 97014 ELECTRIC STIMULATION THERAPY: CPT | Performed by: PHYSICAL THERAPIST

## 2020-05-18 ENCOUNTER — OFFICE VISIT (OUTPATIENT)
Dept: PHYSICAL THERAPY | Age: 45
End: 2020-05-18
Payer: COMMERCIAL

## 2020-05-18 DIAGNOSIS — M54.41 ACUTE RIGHT-SIDED LOW BACK PAIN WITH RIGHT-SIDED SCIATICA: Primary | ICD-10-CM

## 2020-05-18 PROCEDURE — 97012 MECHANICAL TRACTION THERAPY: CPT | Performed by: PHYSICAL THERAPIST

## 2020-05-18 PROCEDURE — 97014 ELECTRIC STIMULATION THERAPY: CPT | Performed by: PHYSICAL THERAPIST

## 2020-05-18 PROCEDURE — G0283 ELEC STIM OTHER THAN WOUND: HCPCS | Performed by: PHYSICAL THERAPIST

## 2020-05-18 PROCEDURE — 97110 THERAPEUTIC EXERCISES: CPT | Performed by: PHYSICAL THERAPIST

## 2020-05-18 PROCEDURE — 97140 MANUAL THERAPY 1/> REGIONS: CPT | Performed by: PHYSICAL THERAPIST

## 2020-05-20 ENCOUNTER — OFFICE VISIT (OUTPATIENT)
Dept: PHYSICAL THERAPY | Age: 45
End: 2020-05-20
Payer: COMMERCIAL

## 2020-05-20 ENCOUNTER — HOSPITAL ENCOUNTER (OUTPATIENT)
Dept: RADIOLOGY | Age: 45
Discharge: HOME | End: 2020-05-20
Payer: COMMERCIAL

## 2020-05-20 DIAGNOSIS — M54.41 ACUTE RIGHT-SIDED LOW BACK PAIN WITH RIGHT-SIDED SCIATICA: Primary | ICD-10-CM

## 2020-05-20 DIAGNOSIS — M54.16 LUMBAR RADICULOPATHY: ICD-10-CM

## 2020-05-20 PROCEDURE — 97140 MANUAL THERAPY 1/> REGIONS: CPT

## 2020-05-20 PROCEDURE — 97012 MECHANICAL TRACTION THERAPY: CPT

## 2020-05-20 PROCEDURE — 97110 THERAPEUTIC EXERCISES: CPT

## 2020-05-20 PROCEDURE — 72148 MRI LUMBAR SPINE W/O DYE: CPT

## 2020-05-21 ENCOUNTER — TELEPHONE (OUTPATIENT)
Dept: PAIN MEDICINE | Facility: CLINIC | Age: 45
End: 2020-05-21

## 2020-05-26 ENCOUNTER — OFFICE VISIT (OUTPATIENT)
Dept: FAMILY MEDICINE CLINIC | Facility: CLINIC | Age: 45
End: 2020-05-26
Payer: COMMERCIAL

## 2020-05-26 VITALS
BODY MASS INDEX: 25.11 KG/M2 | RESPIRATION RATE: 16 BRPM | HEIGHT: 67 IN | DIASTOLIC BLOOD PRESSURE: 80 MMHG | SYSTOLIC BLOOD PRESSURE: 118 MMHG | WEIGHT: 160 LBS | TEMPERATURE: 98 F | HEART RATE: 80 BPM

## 2020-05-26 DIAGNOSIS — R03.0 ELEVATED BLOOD-PRESSURE READING WITHOUT DIAGNOSIS OF HYPERTENSION: ICD-10-CM

## 2020-05-26 DIAGNOSIS — E78.2 MIXED HYPERLIPIDEMIA: Primary | ICD-10-CM

## 2020-05-26 DIAGNOSIS — M54.16 LUMBAR RADICULOPATHY: ICD-10-CM

## 2020-05-26 DIAGNOSIS — R73.01 IFG (IMPAIRED FASTING GLUCOSE): ICD-10-CM

## 2020-05-26 PROCEDURE — 3008F BODY MASS INDEX DOCD: CPT | Performed by: NURSE PRACTITIONER

## 2020-05-26 PROCEDURE — 1036F TOBACCO NON-USER: CPT | Performed by: NURSE PRACTITIONER

## 2020-05-26 PROCEDURE — 99204 OFFICE O/P NEW MOD 45 MIN: CPT | Performed by: NURSE PRACTITIONER

## 2020-05-27 ENCOUNTER — TELEPHONE (OUTPATIENT)
Dept: PAIN MEDICINE | Facility: CLINIC | Age: 45
End: 2020-05-27

## 2020-05-27 ENCOUNTER — OFFICE VISIT (OUTPATIENT)
Dept: PHYSICAL THERAPY | Age: 45
End: 2020-05-27
Payer: COMMERCIAL

## 2020-05-27 DIAGNOSIS — M54.41 ACUTE RIGHT-SIDED LOW BACK PAIN WITH RIGHT-SIDED SCIATICA: Primary | ICD-10-CM

## 2020-05-27 PROCEDURE — G0283 ELEC STIM OTHER THAN WOUND: HCPCS | Performed by: PHYSICAL THERAPIST

## 2020-05-27 PROCEDURE — 97012 MECHANICAL TRACTION THERAPY: CPT | Performed by: PHYSICAL THERAPIST

## 2020-05-27 PROCEDURE — 97014 ELECTRIC STIMULATION THERAPY: CPT | Performed by: PHYSICAL THERAPIST

## 2020-05-29 ENCOUNTER — OFFICE VISIT (OUTPATIENT)
Dept: PHYSICAL THERAPY | Age: 45
End: 2020-05-29
Payer: COMMERCIAL

## 2020-05-29 DIAGNOSIS — M54.41 ACUTE RIGHT-SIDED LOW BACK PAIN WITH RIGHT-SIDED SCIATICA: Primary | ICD-10-CM

## 2020-05-29 PROCEDURE — 97140 MANUAL THERAPY 1/> REGIONS: CPT | Performed by: PHYSICAL THERAPIST

## 2020-05-29 PROCEDURE — 97110 THERAPEUTIC EXERCISES: CPT | Performed by: PHYSICAL THERAPIST

## 2020-05-29 PROCEDURE — 97012 MECHANICAL TRACTION THERAPY: CPT | Performed by: PHYSICAL THERAPIST

## 2020-05-29 PROCEDURE — 97032 APPL MODALITY 1+ESTIM EA 15: CPT | Performed by: PHYSICAL THERAPIST

## 2020-06-01 ENCOUNTER — HOSPITAL ENCOUNTER (OUTPATIENT)
Dept: RADIOLOGY | Facility: CLINIC | Age: 45
Discharge: HOME | End: 2020-06-01
Attending: ANESTHESIOLOGY | Admitting: ANESTHESIOLOGY
Payer: COMMERCIAL

## 2020-06-01 VITALS
OXYGEN SATURATION: 96 % | SYSTOLIC BLOOD PRESSURE: 138 MMHG | DIASTOLIC BLOOD PRESSURE: 94 MMHG | RESPIRATION RATE: 20 BRPM | HEART RATE: 71 BPM | TEMPERATURE: 98.8 F

## 2020-06-01 DIAGNOSIS — M54.16 LUMBAR RADICULOPATHY: ICD-10-CM

## 2020-06-01 PROCEDURE — 64483 NJX AA&/STRD TFRM EPI L/S 1: CPT | Performed by: ANESTHESIOLOGY

## 2020-06-01 PROCEDURE — 64484 NJX AA&/STRD TFRM EPI L/S EA: CPT | Performed by: ANESTHESIOLOGY

## 2020-06-01 RX ORDER — PAPAVERINE HCL 150 MG
20 CAPSULE, EXTENDED RELEASE ORAL ONCE
Status: COMPLETED | OUTPATIENT
Start: 2020-06-01 | End: 2020-06-01

## 2020-06-01 RX ORDER — LIDOCAINE HYDROCHLORIDE 10 MG/ML
5 INJECTION, SOLUTION EPIDURAL; INFILTRATION; INTRACAUDAL; PERINEURAL ONCE
Status: COMPLETED | OUTPATIENT
Start: 2020-06-01 | End: 2020-06-01

## 2020-06-01 RX ADMIN — IOHEXOL 2 ML: 300 INJECTION, SOLUTION INTRAVENOUS at 15:14

## 2020-06-01 RX ADMIN — DEXAMETHASONE SODIUM PHOSPHATE 15 MG: 10 INJECTION, SOLUTION INTRAMUSCULAR; INTRAVENOUS at 15:17

## 2020-06-01 RX ADMIN — LIDOCAINE HYDROCHLORIDE 4 ML: 10 INJECTION, SOLUTION EPIDURAL; INFILTRATION; INTRACAUDAL; PERINEURAL at 15:10

## 2020-06-01 RX ADMIN — LIDOCAINE HYDROCHLORIDE 2 ML: 20 INJECTION, SOLUTION EPIDURAL; INFILTRATION; INTRACAUDAL; PERINEURAL at 15:17

## 2020-06-08 ENCOUNTER — TELEPHONE (OUTPATIENT)
Dept: PAIN MEDICINE | Facility: CLINIC | Age: 45
End: 2020-06-08

## 2020-06-09 ENCOUNTER — OFFICE VISIT (OUTPATIENT)
Dept: PAIN MEDICINE | Facility: CLINIC | Age: 45
End: 2020-06-09
Payer: COMMERCIAL

## 2020-06-09 VITALS
HEART RATE: 91 BPM | TEMPERATURE: 98.7 F | BODY MASS INDEX: 25.12 KG/M2 | SYSTOLIC BLOOD PRESSURE: 119 MMHG | DIASTOLIC BLOOD PRESSURE: 83 MMHG | HEIGHT: 67 IN | WEIGHT: 160.05 LBS

## 2020-06-09 DIAGNOSIS — M54.16 LUMBAR RADICULOPATHY: Primary | ICD-10-CM

## 2020-06-09 DIAGNOSIS — M51.26 LUMBAR DISC HERNIATION: ICD-10-CM

## 2020-06-09 DIAGNOSIS — M48.062 SPINAL STENOSIS OF LUMBAR REGION WITH NEUROGENIC CLAUDICATION: ICD-10-CM

## 2020-06-09 PROCEDURE — 1036F TOBACCO NON-USER: CPT | Performed by: NURSE PRACTITIONER

## 2020-06-09 PROCEDURE — 3008F BODY MASS INDEX DOCD: CPT | Performed by: NURSE PRACTITIONER

## 2020-06-09 PROCEDURE — 99214 OFFICE O/P EST MOD 30 MIN: CPT | Performed by: NURSE PRACTITIONER

## 2020-06-10 NOTE — TELEPHONE ENCOUNTER
Pt called and stated his pain has varied sometime worse and sometime feels slightly better  He was in to see Mckenzie   So far today, it is tolerable but he is having problems sleeping         Pt can be reached at  [phone]

## 2020-06-10 NOTE — TELEPHONE ENCOUNTER
Pt called in to request that his medical records be sent to the doctor below  He has an 06/10/2020 appt       Dr Nkechi Staley  Phone # [phone]        Please be advise thank you      Patient call back # [phone]

## 2020-06-12 ENCOUNTER — APPOINTMENT (OUTPATIENT)
Dept: LAB | Facility: HOSPITAL | Age: 45
End: 2020-06-12
Payer: COMMERCIAL

## 2020-06-12 DIAGNOSIS — R03.0 ELEVATED BLOOD-PRESSURE READING WITHOUT DIAGNOSIS OF HYPERTENSION: ICD-10-CM

## 2020-06-12 DIAGNOSIS — E78.2 MIXED HYPERLIPIDEMIA: ICD-10-CM

## 2020-06-12 DIAGNOSIS — M54.16 LUMBAR RADICULOPATHY: ICD-10-CM

## 2020-06-12 DIAGNOSIS — R73.01 IFG (IMPAIRED FASTING GLUCOSE): ICD-10-CM

## 2020-06-12 LAB
ALBUMIN SERPL BCP-MCNC: 4.1 G/DL (ref 3.5–5)
ALP SERPL-CCNC: 45 U/L (ref 46–116)
ALT SERPL W P-5'-P-CCNC: 45 U/L (ref 12–78)
ANION GAP SERPL CALCULATED.3IONS-SCNC: 5 MMOL/L (ref 4–13)
AST SERPL W P-5'-P-CCNC: 21 U/L (ref 5–45)
BASOPHILS # BLD AUTO: 0.04 THOUSANDS/ΜL (ref 0–0.1)
BASOPHILS NFR BLD AUTO: 1 % (ref 0–1)
BILIRUB SERPL-MCNC: 0.44 MG/DL (ref 0.2–1)
BUN SERPL-MCNC: 30 MG/DL (ref 5–25)
CALCIUM SERPL-MCNC: 9.5 MG/DL (ref 8.3–10.1)
CHLORIDE SERPL-SCNC: 108 MMOL/L (ref 100–108)
CHOLEST SERPL-MCNC: 269 MG/DL (ref 50–200)
CO2 SERPL-SCNC: 27 MMOL/L (ref 21–32)
CREAT SERPL-MCNC: 0.85 MG/DL (ref 0.6–1.3)
EOSINOPHIL # BLD AUTO: 0.16 THOUSAND/ΜL (ref 0–0.61)
EOSINOPHIL NFR BLD AUTO: 3 % (ref 0–6)
ERYTHROCYTE [DISTWIDTH] IN BLOOD BY AUTOMATED COUNT: 13.2 % (ref 11.6–15.1)
EST. AVERAGE GLUCOSE BLD GHB EST-MCNC: 108 MG/DL
GFR SERPL CREATININE-BSD FRML MDRD: 106 ML/MIN/1.73SQ M
GLUCOSE P FAST SERPL-MCNC: 89 MG/DL (ref 65–99)
HBA1C MFR BLD: 5.4 %
HCT VFR BLD AUTO: 39.4 % (ref 36.5–49.3)
HDLC SERPL-MCNC: 51 MG/DL
HGB BLD-MCNC: 12.7 G/DL (ref 12–17)
IMM GRANULOCYTES # BLD AUTO: 0.01 THOUSAND/UL (ref 0–0.2)
IMM GRANULOCYTES NFR BLD AUTO: 0 % (ref 0–2)
LDLC SERPL CALC-MCNC: 185 MG/DL (ref 0–100)
LYMPHOCYTES # BLD AUTO: 1.59 THOUSANDS/ΜL (ref 0.6–4.47)
LYMPHOCYTES NFR BLD AUTO: 30 % (ref 14–44)
MCH RBC QN AUTO: 30.4 PG (ref 26.8–34.3)
MCHC RBC AUTO-ENTMCNC: 32.2 G/DL (ref 31.4–37.4)
MCV RBC AUTO: 94 FL (ref 82–98)
MONOCYTES # BLD AUTO: 0.58 THOUSAND/ΜL (ref 0.17–1.22)
MONOCYTES NFR BLD AUTO: 11 % (ref 4–12)
NEUTROPHILS # BLD AUTO: 2.85 THOUSANDS/ΜL (ref 1.85–7.62)
NEUTS SEG NFR BLD AUTO: 55 % (ref 43–75)
NONHDLC SERPL-MCNC: 218 MG/DL
NRBC BLD AUTO-RTO: 0 /100 WBCS
PLATELET # BLD AUTO: 275 THOUSANDS/UL (ref 149–390)
PMV BLD AUTO: 10.1 FL (ref 8.9–12.7)
POTASSIUM SERPL-SCNC: 4.3 MMOL/L (ref 3.5–5.3)
PROT SERPL-MCNC: 7.5 G/DL (ref 6.4–8.2)
RBC # BLD AUTO: 4.18 MILLION/UL (ref 3.88–5.62)
SODIUM SERPL-SCNC: 140 MMOL/L (ref 136–145)
TRIGL SERPL-MCNC: 166 MG/DL
TSH SERPL DL<=0.05 MIU/L-ACNC: 1.41 UIU/ML (ref 0.36–3.74)
WBC # BLD AUTO: 5.23 THOUSAND/UL (ref 4.31–10.16)

## 2020-06-12 PROCEDURE — 80053 COMPREHEN METABOLIC PANEL: CPT

## 2020-06-12 PROCEDURE — 80061 LIPID PANEL: CPT

## 2020-06-12 PROCEDURE — 36415 COLL VENOUS BLD VENIPUNCTURE: CPT

## 2020-06-12 PROCEDURE — 84443 ASSAY THYROID STIM HORMONE: CPT

## 2020-06-12 PROCEDURE — 85025 COMPLETE CBC W/AUTO DIFF WBC: CPT

## 2020-06-12 PROCEDURE — 83036 HEMOGLOBIN GLYCOSYLATED A1C: CPT

## 2020-07-06 ENCOUNTER — TELEMEDICINE (OUTPATIENT)
Dept: PAIN MEDICINE | Facility: CLINIC | Age: 45
End: 2020-07-06
Payer: COMMERCIAL

## 2020-07-06 DIAGNOSIS — M54.16 LUMBAR RADICULOPATHY: Primary | ICD-10-CM

## 2020-07-06 DIAGNOSIS — M51.26 LUMBAR DISC HERNIATION: ICD-10-CM

## 2020-07-06 DIAGNOSIS — M47.816 LUMBAR SPONDYLOSIS: ICD-10-CM

## 2020-07-06 DIAGNOSIS — M48.062 SPINAL STENOSIS OF LUMBAR REGION WITH NEUROGENIC CLAUDICATION: ICD-10-CM

## 2020-07-06 PROCEDURE — 99214 OFFICE O/P EST MOD 30 MIN: CPT | Performed by: NURSE PRACTITIONER

## 2020-07-06 RX ORDER — GABAPENTIN 300 MG/1
CAPSULE ORAL
Qty: 180 CAPSULE | Refills: 1 | Status: SHIPPED | OUTPATIENT
Start: 2020-07-06 | End: 2020-07-10

## 2020-07-06 NOTE — PATIENT INSTRUCTIONS
Plan:  1  We can repeat right L4 and L5 TFESI p r n     I discussed with the patient that since there has been moderate to significant improvement in the pain symptoms, we will hold off on any repeat injections at this point in time  However, I reviewed with the patient that if their symptoms should return or worsen,  they should call our office to schedule to discuss repeating the injection  2  The patient will decrease gabapentin to 600 mg in the evening and 900 mg at bedtime x3 days, then 600 mg twice a day  This medication was refilled today  3  The patient may continue diclofenac 50 mg twice a day as needed  4  The patient may continue Tylenol p r n  And should not exceed more than 3000 mg daily  5  The patient will continue with his home exercise program  6  The patient will follow-up in 8 weeks for medication prescription refill and reevaluation  The patient was advised to contact the office should their symptoms worsen in the interim  The patient was agreeable and verbalized an understanding

## 2020-07-06 NOTE — PROGRESS NOTES
Virtual Brief Visit    Assessment/Plan:    Problem List Items Addressed This Visit        Nervous and Auditory    Lumbar radiculopathy - Primary    Relevant Medications    gabapentin (NEURONTIN) 300 mg capsule       Musculoskeletal and Integument    Lumbar disc herniation    Lumbar spondylosis       Other    Spinal stenosis of lumbar region with neurogenic claudication             Reason for visit is low back and leg pain    Chief Complaint   Patient presents with    Virtual Brief Visit        Encounter provider ZACHERY Spann    Provider located at [address] [place] [id number]    Recent Visits  No visits were found meeting these conditions  Showing recent visits within past 7 days and meeting all other requirements     Today's Visits  Date Type Provider Dept   07/06/20 Telemedicine [facility]   Showing today's visits and meeting all other requirements     Future Appointments  No visits were found meeting these conditions  Showing future appointments within next 150 days and meeting all other requirements        After connecting through telephone, the patient was identified by name and date of birth  Susi Peñaloza was informed that this is a telemedicine visit and that the visit is being conducted through telephone  My office door was closed  No one else was in the room  He acknowledged consent and understanding of privacy and security of the platform  The patient has agreed to participate and understands he can discontinue the visit at any time  It was my intent to perform this visit via video technology but the patient was not able to do a video connection so the visit was completed via audio telephone only  Patient is aware this is a billable service       Subjective    Susi Peñaloza is a 41 y o  male last seen on June 9, 2020 returns for follow-up visit in regards to chronic lumbosacral back pain that radiates into the right lower extremity with associated numbness and paresthesias secondary to lumbar degenerative disc disease, lumbar spondylosis, lumbar spondylolisthesis and lumbar stenosis  The patient denies left lower extremity symptoms, bowel or bladder incontinence or saddle anesthesia  Patient is status post right L4 and L5 TFESI with Dr Tammi Auguste on June 1, 2020 and has noted a 70% improvement of his pain from the procedure  He does continue occasionally with pins and needles below his knee and into his ankle, however the symptoms are much improved  He continues on gabapentin 600 mg in the evening and 1200 mg at bedtime  He is interested in continuing to wean down off of this medication at this time  He rates his pain a 1 to 2/10 on the numeric pain rating scale  States his pain is intermittent in nature and most bothersome at night  He characterizes the pain as burning, and pins and needles  MRI of the lumbar spine reveals a right disc protrusion causing mild right nerve root encroachment with abutment of the right L2 nerve root at L2-3, a right foraminal annular fissure and protrusion abutting the exiting right L3 nerve root with moderate right foraminal stenosis at L3-4, a lateral right disc protrusion with moderate right foraminal stenosis and mild-to-moderate left foraminal stenosis at L4-5, and anterolisthesis with a annular fissure causing severe bilateral foraminal stenosis and L5 nerve root impingement at L5-S1  HPI     Past Medical History:   Diagnosis Date    Hypertension        No past surgical history on file      Current Outpatient Medications   Medication Sig Dispense Refill    acetaminophen (TYLENOL) 500 mg tablet Take 500 mg by mouth every 6 (six) hours as needed for mild pain      diclofenac sodium (VOLTAREN) 50 mg EC tablet Take 1 tablet (50 mg total) by mouth 2 (two) times a day 60 tablet 1    gabapentin (NEURONTIN) 300 mg capsule Take 2 PO AM and 4 PO  capsule 1    methocarbamol (ROBAXIN) 750 mg tablet Take 1 tablet (750 mg total) by mouth every 6 (six) hours as needed for muscle spasms (Patient not taking: Reported on 5/26/2020) 60 tablet 0     No current facility-administered medications for this visit  No Known Allergies    Review of Systems   Constitutional: Negative for chills and fever  Respiratory: Negative for cough and shortness of breath  Cardiovascular: Negative for chest pain and leg swelling  Gastrointestinal: Negative for abdominal pain  Musculoskeletal: Positive for back pain and gait problem  Neurological: Positive for numbness  Negative for dizziness and light-headedness  There were no vitals filed for this visit  Plan:  1  We can repeat right L4 and L5 TFESI p r n     I discussed with the patient that since there has been moderate to significant improvement in the pain symptoms, we will hold off on any repeat injections at this point in time  However, I reviewed with the patient that if their symptoms should return or worsen,  they should call our office to schedule to discuss repeating the injection  2  The patient will decrease gabapentin to 600 mg in the evening and 900 mg at bedtime x3 days, then 600 mg twice a day  This medication was refilled today  3  The patient may continue diclofenac 50 mg twice a day as needed  4  The patient may continue Tylenol p r n  And should not exceed more than 3000 mg daily  5  The patient will continue with his home exercise program  6  The patient will follow-up in 8 weeks for medication prescription refill and reevaluation  The patient was advised to contact the office should their symptoms worsen in the interim  The patient was agreeable and verbalized an understanding  As a result of this visit, I have not referred the patient for further respiratory evaluation       I spent 15 minutes directly with the patient during this visit    VIRTUAL VISIT Yuliana Hamm acknowledges that he has consented to an online visit or consultation  He understands that the online visit is based solely on information provided by him, and that, in the absence of a face-to-face physical evaluation by the physician, the diagnosis he receives is both limited and provisional in terms of accuracy and completeness  This is not intended to replace a full medical face-to-face evaluation by the physician  Nuno Fernandez understands and accepts these terms

## 2020-07-10 ENCOUNTER — TELEPHONE (OUTPATIENT)
Dept: PAIN MEDICINE | Facility: CLINIC | Age: 45
End: 2020-07-10

## 2020-07-10 DIAGNOSIS — M54.16 LUMBAR RADICULOPATHY: ICD-10-CM

## 2020-07-10 RX ORDER — GABAPENTIN 300 MG/1
CAPSULE ORAL
Qty: 180 CAPSULE | Refills: 1 | Status: SHIPPED | OUTPATIENT
Start: 2020-07-10 | End: 2020-09-08 | Stop reason: SDUPTHER

## 2020-09-08 ENCOUNTER — OFFICE VISIT (OUTPATIENT)
Dept: PAIN MEDICINE | Facility: CLINIC | Age: 45
End: 2020-09-08
Payer: COMMERCIAL

## 2020-09-08 VITALS
DIASTOLIC BLOOD PRESSURE: 92 MMHG | HEIGHT: 67 IN | SYSTOLIC BLOOD PRESSURE: 141 MMHG | HEART RATE: 94 BPM | WEIGHT: 163 LBS | BODY MASS INDEX: 25.58 KG/M2 | TEMPERATURE: 98.2 F

## 2020-09-08 DIAGNOSIS — M51.26 LUMBAR DISC HERNIATION: ICD-10-CM

## 2020-09-08 DIAGNOSIS — G89.4 CHRONIC PAIN SYNDROME: Primary | ICD-10-CM

## 2020-09-08 DIAGNOSIS — M47.816 LUMBAR SPONDYLOSIS: ICD-10-CM

## 2020-09-08 DIAGNOSIS — M48.062 SPINAL STENOSIS OF LUMBAR REGION WITH NEUROGENIC CLAUDICATION: ICD-10-CM

## 2020-09-08 DIAGNOSIS — M54.16 LUMBAR RADICULOPATHY: ICD-10-CM

## 2020-09-08 PROCEDURE — 99214 OFFICE O/P EST MOD 30 MIN: CPT | Performed by: NURSE PRACTITIONER

## 2020-09-08 PROCEDURE — 1036F TOBACCO NON-USER: CPT | Performed by: NURSE PRACTITIONER

## 2020-09-08 RX ORDER — GABAPENTIN 300 MG/1
CAPSULE ORAL
Qty: 180 CAPSULE | Refills: 2 | Status: SHIPPED | OUTPATIENT
Start: 2020-09-08 | End: 2020-12-01

## 2020-09-08 NOTE — PROGRESS NOTES
Assessment:  1  Chronic pain syndrome    2  Lumbar disc herniation    3  Lumbar radiculopathy    4  Lumbar spondylosis    5  Spinal stenosis of lumbar region with neurogenic claudication        Plan:  1  The patient may continue gabapentin 600 mg in the morning and 1200 mg at bedtime  This medication was refilled today  2  I will provide the patient with a referral to Dr Sam Moses for surgical evaluation  3  We can repeat right L4 and L5 TFESI p r n     The patient is mostly doing well and does not feel repeat as necessary this time  4  The patient may continue Tylenol p r n  And should not exceed more than 4000 mg in 24 hours  5  The patient will continue with his home exercise program  6  The patient will follow-up in 3 months for medication prescription refill and reevaluation  The patient was advised to contact the office should their symptoms worsen in the interim  The patient was agreeable and verbalized an understanding  M*Modal software was used to dictate this note  It may contain errors with dictating incorrect words or incorrect spelling  Please contact the provider directly with any questions  History of Present Illness: The patient is a 41 y o  male last seen on 7/6/20 who presents for a follow up office visit in regards to chronic lumbosacral back pain that radiates into the L4 and L5 distributions of the right lower extremity with associated numbness and paresthesias secondary to lumbar degenerative disc disease, lumbar spondylosis, lumbar spondylolisthesis, and lumbar stenosis  The patient will very intermittently have left lower extremity symptoms which are self-limiting and mild  He denies bowel or bladder incontinence or saddle anesthesia  The patient is status post right L4 and L5 TFESI with Dr Lindsay Quinonez on June 1, 2020 and states that he had noted about a 70% improvement of his pain from the procedure  He does have persistent numbness most notably in the right shin and foot    He is currently taking gabapentin 600 mg in the morning and 1200 mg at bedtime  He has tried to wean down on this medication, however noticed significant increase in the neuropathic pain in his right ankle when doing so  He has since increased back to therapeutic dosing and has noticed that the pain has been pretty well managed  He has never been evaluated by a surgeon in the past and although is not interested in surgery currently, he would like to establish with someone for an opinion  MRI of the lumbar spine reveals a right disc protrusion causing mild right nerve root encroachment with abutment of the right L2 nerve root at L2-3, a right foraminal annular fissure and protrusion abutting the exiting right L3 nerve root with moderate right foraminal stenosis at L3-4, a lateral right disc protrusion with moderate right foraminal stenosis and mild-to-moderate left foraminal stenosis at L4-5, and anterolisthesis with a annular fissure causing severe bilateral foraminal stenosis and L5 nerve root impingement at L5-S1      The patient rates his pain a 1/10 on the numeric pain rating scale  States pain is occasional nature bothersome in the evening  He characterizes the pain as burning, sharp, numbness and pins and needles  I have personally reviewed and/or updated the patient's past medical history, past surgical history, family history, social history, current medications, allergies, and vital signs today  Review of Systems:    Review of Systems   Constitutional: Negative for fever and unexpected weight change  HENT: Negative for trouble swallowing  Eyes: Negative for visual disturbance  Respiratory: Negative for shortness of breath and wheezing  Cardiovascular: Negative for chest pain and palpitations  Gastrointestinal: Negative for constipation, diarrhea, nausea and vomiting  Endocrine: Negative for cold intolerance, heat intolerance and polydipsia     Genitourinary: Negative for difficulty urinating and frequency  Musculoskeletal: Negative for arthralgias, gait problem, joint swelling and myalgias  Skin: Negative for rash  Neurological: Negative for dizziness, seizures, syncope, weakness and headaches  Hematological: Does not bruise/bleed easily  Psychiatric/Behavioral: Negative for dysphoric mood  All other systems reviewed and are negative  Past Medical History:   Diagnosis Date    Hypertension        No past surgical history on file  Family History   Problem Relation Age of Onset    Hypertension Father     Hypertension Brother        Social History     Occupational History    Not on file   Tobacco Use    Smoking status: Former Smoker    Smokeless tobacco: Never Used    Tobacco comment: quit 2002   Substance and Sexual Activity    Alcohol use: Yes     Frequency: Monthly or less     Drinks per session: 1 or 2     Binge frequency: Never    Drug use: Not Currently    Sexual activity: Not on file         Current Outpatient Medications:     acetaminophen (TYLENOL) 500 mg tablet, Take 500 mg by mouth every 6 (six) hours as needed for mild pain, Disp: , Rfl:     gabapentin (NEURONTIN) 300 mg capsule, Take 2 capsules PO AM and 4 capsules PO HS, Disp: 180 capsule, Rfl: 2    diclofenac sodium (VOLTAREN) 50 mg EC tablet, Take 1 tablet (50 mg total) by mouth 2 (two) times a day (Patient not taking: Reported on 9/8/2020), Disp: 60 tablet, Rfl: 1    No Known Allergies    Physical Exam:    /92   Pulse 94   Temp 98 2 °F (36 8 °C)   Ht 5' 6 5" (1 689 m)   Wt 73 9 kg (163 lb)   BMI 25 91 kg/m²     Constitutional:normal, well developed, well nourished, alert, in no distress and non-toxic and no overt pain behavior    Eyes:anicteric  HEENT:grossly intact  Neck:supple, symmetric, trachea midline and no masses   Pulmonary:even and unlabored  Cardiovascular:No edema or pitting edema present  Skin:Normal without rashes or lesions and well hydrated  Psychiatric:Mood and affect appropriate  Neurologic:Cranial Nerves II-XII grossly intact  Musculoskeletal:normal gait  Imaging  No orders to display     MRI LUMBAR SPINE WITHOUT CONTRAST     INDICATION: Low back pain and right leg radiculopathy for 2 months      COMPARISON:  Radiographs of the lumbar spine from April 13, 2020      TECHNIQUE:  Sagittal T1, sagittal T2, sagittal inversion recovery, axial T1 and axial T2, coronal T2  Imaging performed on 3 0T MRI  IMAGE QUALITY:  Diagnostic     FINDINGS:     VERTEBRAL BODIES:  There are 5 lumbar type vertebral bodies  Grade 1 anterolisthesis of L5 on S1, unchanged from the prior study  Chronic bilateral L5 pars defects  No lateral subluxation or scoliosis  Normal marrow signal is identified within the   visualized bony structures  No discrete marrow lesion      SACRUM:  Normal signal within the sacrum  No evidence of insufficiency or stress fracture      DISTAL CORD AND CONUS:  Normal size and signal within the distal cord and conus      PARASPINAL SOFT TISSUES:  Paraspinal soft tissues are unremarkable      LOWER THORACIC DISC SPACES:  Normal disc height and signal   No disc herniation, canal stenosis or foraminal narrowing      LUMBAR DISC SPACES:  Mild disc desiccation and height loss at L4-L5  Moderate disc desiccation and height loss at L5-S1      L1-L2:  Normal      L2-L3:  Right foraminal and far lateral annular fissure and protrusion  No spinal canal stenosis  Mild right inferior foraminal encroachment  The exiting right L2 nerve root abuts the disc protrusion, correlate for ipsilateral radiculopathy      L3-L4:  Right foraminal annular fissure and protrusion abutting the exited right L3 nerve root  There is moderate right foraminal stenosis  Patent spinal canal and left neural foramina  No spinal stenosis      L4-L5:  Mild disc height loss   Mild circumferential disc bulge with right foraminal and far lateral annular fissure and protrusion with moderate right foraminal stenosis  Mild to moderate left foraminal stenosis  No spinal stenosis  Bilateral facet   arthropathy with a small amount of inflammatory fluid within the facet joints      L5-S1:  Disc space narrowing, uncovering of the posterior disc margin due to the anterolisthesis  Circumferential disc bulge with a left foraminal annular fissure and protrusion  There is severe bilateral foraminal stenosis and L5 nerve root impingement  Spinal canal remains patent  Mild bilateral facet arthropathy      IMPRESSION:     Chronic bilateral L5 spondylolysis and stable grade 1 spondylolisthesis at L5-S1 with advanced degenerative discogenic disease and severe bilateral L5 foraminal stenosis; correlate for bilateral L5 radiculitis      Right-sided foraminal protrusions at L2-L3, L3-L4 and L4-L5, correlate for ipsilateral L2, L3 and L4 radiculopathy      Additional degenerative discogenic disease as described above      Orders Placed This Encounter   Procedures    Ambulatory referral to Orthopedic Surgery

## 2020-11-02 ENCOUNTER — OFFICE VISIT (OUTPATIENT)
Dept: OBGYN CLINIC | Facility: HOSPITAL | Age: 45
End: 2020-11-02
Payer: COMMERCIAL

## 2020-11-02 VITALS
SYSTOLIC BLOOD PRESSURE: 138 MMHG | HEIGHT: 66 IN | DIASTOLIC BLOOD PRESSURE: 87 MMHG | HEART RATE: 91 BPM | WEIGHT: 165 LBS | BODY MASS INDEX: 26.52 KG/M2

## 2020-11-02 DIAGNOSIS — M54.41 ACUTE RIGHT-SIDED LOW BACK PAIN WITH RIGHT-SIDED SCIATICA: Primary | ICD-10-CM

## 2020-11-02 DIAGNOSIS — M54.16 LUMBAR RADICULOPATHY: ICD-10-CM

## 2020-11-02 PROCEDURE — 1036F TOBACCO NON-USER: CPT | Performed by: ORTHOPAEDIC SURGERY

## 2020-11-02 PROCEDURE — 99243 OFF/OP CNSLTJ NEW/EST LOW 30: CPT | Performed by: ORTHOPAEDIC SURGERY

## 2020-11-02 PROCEDURE — 3008F BODY MASS INDEX DOCD: CPT | Performed by: ORTHOPAEDIC SURGERY

## 2020-11-19 ENCOUNTER — TELEPHONE (OUTPATIENT)
Dept: PAIN MEDICINE | Facility: CLINIC | Age: 45
End: 2020-11-19

## 2020-12-01 ENCOUNTER — OFFICE VISIT (OUTPATIENT)
Dept: PAIN MEDICINE | Facility: CLINIC | Age: 45
End: 2020-12-01
Payer: COMMERCIAL

## 2020-12-01 VITALS
SYSTOLIC BLOOD PRESSURE: 121 MMHG | DIASTOLIC BLOOD PRESSURE: 82 MMHG | HEART RATE: 82 BPM | HEIGHT: 66 IN | TEMPERATURE: 98.5 F | WEIGHT: 165 LBS | BODY MASS INDEX: 26.52 KG/M2

## 2020-12-01 DIAGNOSIS — M48.062 SPINAL STENOSIS OF LUMBAR REGION WITH NEUROGENIC CLAUDICATION: ICD-10-CM

## 2020-12-01 DIAGNOSIS — G89.4 CHRONIC PAIN SYNDROME: Primary | ICD-10-CM

## 2020-12-01 DIAGNOSIS — M54.16 LUMBAR RADICULOPATHY: ICD-10-CM

## 2020-12-01 DIAGNOSIS — M51.26 LUMBAR DISC HERNIATION: ICD-10-CM

## 2020-12-01 DIAGNOSIS — M47.816 LUMBAR SPONDYLOSIS: ICD-10-CM

## 2020-12-01 PROCEDURE — 1036F TOBACCO NON-USER: CPT | Performed by: NURSE PRACTITIONER

## 2020-12-01 PROCEDURE — 3008F BODY MASS INDEX DOCD: CPT | Performed by: NURSE PRACTITIONER

## 2020-12-01 PROCEDURE — 99214 OFFICE O/P EST MOD 30 MIN: CPT | Performed by: NURSE PRACTITIONER

## 2020-12-01 RX ORDER — GABAPENTIN 600 MG/1
TABLET ORAL
Qty: 90 TABLET | Refills: 2 | Status: SHIPPED | OUTPATIENT
Start: 2020-12-01 | End: 2020-12-01 | Stop reason: SDUPTHER

## 2020-12-01 RX ORDER — GABAPENTIN 600 MG/1
TABLET ORAL
Qty: 270 TABLET | Refills: 0 | Status: SHIPPED | OUTPATIENT
Start: 2020-12-01 | End: 2021-02-23 | Stop reason: SDUPTHER

## 2021-02-23 ENCOUNTER — OFFICE VISIT (OUTPATIENT)
Dept: PAIN MEDICINE | Facility: CLINIC | Age: 46
End: 2021-02-23
Payer: COMMERCIAL

## 2021-02-23 VITALS — BODY MASS INDEX: 27 KG/M2 | WEIGHT: 168 LBS | HEIGHT: 66 IN | TEMPERATURE: 98.2 F

## 2021-02-23 DIAGNOSIS — M48.062 SPINAL STENOSIS OF LUMBAR REGION WITH NEUROGENIC CLAUDICATION: ICD-10-CM

## 2021-02-23 DIAGNOSIS — M47.816 LUMBAR SPONDYLOSIS: ICD-10-CM

## 2021-02-23 DIAGNOSIS — M51.26 LUMBAR DISC HERNIATION: ICD-10-CM

## 2021-02-23 DIAGNOSIS — G89.4 CHRONIC PAIN SYNDROME: Primary | ICD-10-CM

## 2021-02-23 DIAGNOSIS — M54.16 LUMBAR RADICULOPATHY: ICD-10-CM

## 2021-02-23 PROCEDURE — 1036F TOBACCO NON-USER: CPT | Performed by: NURSE PRACTITIONER

## 2021-02-23 PROCEDURE — 3008F BODY MASS INDEX DOCD: CPT | Performed by: NURSE PRACTITIONER

## 2021-02-23 PROCEDURE — 99214 OFFICE O/P EST MOD 30 MIN: CPT | Performed by: NURSE PRACTITIONER

## 2021-02-23 RX ORDER — GABAPENTIN 600 MG/1
TABLET ORAL
Qty: 270 TABLET | Refills: 0 | Status: SHIPPED | OUTPATIENT
Start: 2021-02-23 | End: 2021-06-14 | Stop reason: SDUPTHER

## 2021-02-23 NOTE — PROGRESS NOTES
Assessment:  1  Chronic pain syndrome    2  Lumbar radiculopathy    3  Lumbar disc herniation    4  Lumbar spondylosis    5  Spinal stenosis of lumbar region with neurogenic claudication        Plan:  1  The patient may continue gabapentin 600 mg in the morning and 1200 mg at bedtime and diclofenac 50 mg twice a day p r n  as prescribed  Both of these medications were refilled today  2  The patient may continue Tylenol p r n  and should not exceed more than 3000 mg in 24 hours   3  We can repeat right L4 and L5 TFESI p r n  Shyanne Mccain Should the patient's bilateral symptoms worsen in the future, would also be a candidate for bilateral L5 TFESI  4  The patient will continue with home exercise program  5  The patient will follow-up in 3 months or sooner if needed     M*Modal software was used to dictate this note  It may contain errors with dictating incorrect words or incorrect spelling  Please contact the provider directly with any questions  History of Present Illness: The patient is a 41 y o  male last seen on 12/1/20 who presents for a follow up office visit in regards to chronic lumbosacral back pain with radiculopathy into the bilateral lower extremities with associated numbness, paresthesias and subjective weakness, right greater left secondary to  Lumbar degenerative disc disease, lumbar spondylosis, lumbar spondylolisthesis, lumbar stenosis, lumbar radiculopathy and chronic pain syndrome  The patient denies bowel or bladder incontinence or saddle anesthesia  The patient's symptoms have primarily always been in his right lower extremity  He is status post right L4 and L5 TFESI with Dr Diogo Myers on June 1, 2020 and reported a 90% improvement of his symptoms from this procedure  He does continue on gabapentin 1800 mg daily and diclofenac 50 mg b i d  p r n  with Tylenol p r n  with an 80-90% improvement that is ongoing at this time    He has stated, however, that he has noticed a decreased libido with gabapentin, however for now wishes to remain on the medication at its current dose  He has had a consultation with Dr Nolan Olivarez in the past, and was virtually pain free at that time therefore surgical intervention was not recommended  He rates his pain a 1 to 3/10 on the numeric pain rating scale  He states his pain is occasional in nature and bothersome in the evening and at night  He characterizes the pain as dull aching, sharp, pressure like, anumbness and pins and needles  I have personally reviewed and/or updated the patient's past medical history, past surgical history, family history, social history, current medications, allergies, and vital signs today  Review of Systems:    Review of Systems   Respiratory: Negative for shortness of breath  Cardiovascular: Negative for chest pain  Gastrointestinal: Negative for constipation, diarrhea, nausea and vomiting  Musculoskeletal: Negative for arthralgias, gait problem, joint swelling and myalgias  Skin: Negative for rash  Neurological: Negative for dizziness, seizures and weakness  All other systems reviewed and are negative  Past Medical History:   Diagnosis Date    Hypertension        No past surgical history on file      Family History   Problem Relation Age of Onset    Hypertension Father     Hypertension Brother        Social History     Occupational History    Not on file   Tobacco Use    Smoking status: Former Smoker    Smokeless tobacco: Never Used    Tobacco comment: quit 2002   Substance and Sexual Activity    Alcohol use: Yes     Frequency: Monthly or less     Drinks per session: 1 or 2     Binge frequency: Never    Drug use: Not Currently    Sexual activity: Not on file         Current Outpatient Medications:     acetaminophen (TYLENOL) 500 mg tablet, Take 500 mg by mouth every 6 (six) hours as needed for mild pain, Disp: , Rfl:     diclofenac sodium (VOLTAREN) 50 mg EC tablet, Take 1 tablet (50 mg total) by mouth 2 (two) times a day, Disp: 180 tablet, Rfl: 0    gabapentin (NEURONTIN) 600 MG tablet, Take 1 PO AM and 2 PO HS, Disp: 270 tablet, Rfl: 0    No Known Allergies    Physical Exam:    Temp 98 2 °F (36 8 °C)   Ht 5' 6" (1 676 m)   Wt 76 2 kg (168 lb)   BMI 27 12 kg/m²     Constitutional:normal, well developed, well nourished, alert, in no distress and non-toxic and no overt pain behavior  Eyes:anicteric  HEENT:grossly intact  Neck:supple, symmetric, trachea midline and no masses   Pulmonary:even and unlabored  Cardiovascular:No edema or pitting edema present  Skin:Normal without rashes or lesions and well hydrated  Psychiatric:Mood and affect appropriate  Neurologic:Cranial Nerves II-XII grossly intact  Musculoskeletal:normal  Gait      Imaging  No orders to display     MRI LUMBAR SPINE WITHOUT CONTRAST     INDICATION: Low back pain and right leg radiculopathy for 2 months      COMPARISON:  Radiographs of the lumbar spine from April 13, 2020      TECHNIQUE:  Sagittal T1, sagittal T2, sagittal inversion recovery, axial T1 and axial T2, coronal T2  Imaging performed on 3 0T MRI  IMAGE QUALITY:  Diagnostic     FINDINGS:     VERTEBRAL BODIES:  There are 5 lumbar type vertebral bodies  Grade 1 anterolisthesis of L5 on S1, unchanged from the prior study  Chronic bilateral L5 pars defects  No lateral subluxation or scoliosis  Normal marrow signal is identified within the   visualized bony structures  No discrete marrow lesion      SACRUM:  Normal signal within the sacrum  No evidence of insufficiency or stress fracture      DISTAL CORD AND CONUS:  Normal size and signal within the distal cord and conus      PARASPINAL SOFT TISSUES:  Paraspinal soft tissues are unremarkable      LOWER THORACIC DISC SPACES:  Normal disc height and signal   No disc herniation, canal stenosis or foraminal narrowing      LUMBAR DISC SPACES:  Mild disc desiccation and height loss at L4-L5   Moderate disc desiccation and height loss at L5-S1      L1-L2:  Normal      L2-L3:  Right foraminal and far lateral annular fissure and protrusion  No spinal canal stenosis  Mild right inferior foraminal encroachment  The exiting right L2 nerve root abuts the disc protrusion, correlate for ipsilateral radiculopathy      L3-L4:  Right foraminal annular fissure and protrusion abutting the exited right L3 nerve root  There is moderate right foraminal stenosis  Patent spinal canal and left neural foramina  No spinal stenosis      L4-L5:  Mild disc height loss  Mild circumferential disc bulge with right foraminal and far lateral annular fissure and protrusion with moderate right foraminal stenosis  Mild to moderate left foraminal stenosis  No spinal stenosis  Bilateral facet   arthropathy with a small amount of inflammatory fluid within the facet joints      L5-S1:  Disc space narrowing, uncovering of the posterior disc margin due to the anterolisthesis  Circumferential disc bulge with a left foraminal annular fissure and protrusion  There is severe bilateral foraminal stenosis and L5 nerve root impingement  Spinal canal remains patent  Mild bilateral facet arthropathy      IMPRESSION:     Chronic bilateral L5 spondylolysis and stable grade 1 spondylolisthesis at L5-S1 with advanced degenerative discogenic disease and severe bilateral L5 foraminal stenosis; correlate for bilateral L5 radiculitis      Right-sided foraminal protrusions at L2-L3, L3-L4 and L4-L5, correlate for ipsilateral L2, L3 and L4 radiculopathy      Additional degenerative discogenic disease as described above        No orders of the defined types were placed in this encounter

## 2021-03-30 DIAGNOSIS — Z23 ENCOUNTER FOR IMMUNIZATION: ICD-10-CM

## 2021-05-18 ENCOUNTER — OFFICE VISIT (OUTPATIENT)
Dept: PAIN MEDICINE | Facility: CLINIC | Age: 46
End: 2021-05-18
Payer: COMMERCIAL

## 2021-05-18 VITALS
WEIGHT: 165 LBS | HEIGHT: 66 IN | BODY MASS INDEX: 26.52 KG/M2 | HEART RATE: 86 BPM | DIASTOLIC BLOOD PRESSURE: 82 MMHG | SYSTOLIC BLOOD PRESSURE: 124 MMHG | TEMPERATURE: 98.2 F

## 2021-05-18 DIAGNOSIS — G89.4 CHRONIC PAIN SYNDROME: Primary | ICD-10-CM

## 2021-05-18 DIAGNOSIS — M48.062 SPINAL STENOSIS OF LUMBAR REGION WITH NEUROGENIC CLAUDICATION: ICD-10-CM

## 2021-05-18 DIAGNOSIS — M51.26 LUMBAR DISC HERNIATION: ICD-10-CM

## 2021-05-18 DIAGNOSIS — M54.16 LUMBAR RADICULOPATHY: ICD-10-CM

## 2021-05-18 DIAGNOSIS — M47.816 LUMBAR SPONDYLOSIS: ICD-10-CM

## 2021-05-18 PROCEDURE — 99214 OFFICE O/P EST MOD 30 MIN: CPT | Performed by: NURSE PRACTITIONER

## 2021-05-18 PROCEDURE — 1036F TOBACCO NON-USER: CPT | Performed by: NURSE PRACTITIONER

## 2021-05-18 PROCEDURE — 3008F BODY MASS INDEX DOCD: CPT | Performed by: NURSE PRACTITIONER

## 2021-05-18 NOTE — PROGRESS NOTES
Assessment:  1  Chronic pain syndrome    2  Lumbar radiculopathy    3  Lumbar disc herniation    4  Spinal stenosis of lumbar region with neurogenic claudication    5  Lumbar spondylosis        Plan:  1  The patient may continue gabapentin 600 mg in the morning and 1200 mg at bedtime  He does not require refills today  2  The patient may continue diclofenac 50 mg twice a day p r n  pain as prescribed  I did advise that since he has been on this medication for such a long time, he may try and consider weaning down usage of this medication  He denies any side effects currently  3  The patient may continue tylenol PRN and should not exceed more than 3000mg in 24 hours  4  Can consider a B/L L5 TFESI in the future  5  The patient will continue with his home exercise program  6  The patient will follow-up in 3 months or sooner if needed     M*Modal software was used to dictate this note  It may contain errors with dictating incorrect words or incorrect spelling  Please contact the provider directly with any questions  History of Present Illness: The patient is a 40 y o  male last seen on 2/23/21 who presents for a follow up office visit in regards to chronic lumbosacral back pain with radiculopathy into the anteromedial aspect of the bilateral lower extremities with associated numbness, paresthesias and subjective weakness secondary to  Lumbar degenerative disc disease, lumbar spondylosis, lumbar spondylolisthesis, lumbar stenosis, lumbar radiculopathy and chronic pain syndrome  The patient denies bowel or bladder incontinence or saddle anesthesia  The patient offers no new complaints today  He is managing his pain with gabapentin 1800 mg daily, diclofenac 50 mg b i d  p r n  and Tylenol p r n  with a 90% improvement of his pain without side effects  He has had a right L4 and L5 TFESI in the past with significant improvement of his pain from this procedure    He does not feel his pain warrants a repeat injection at this time  He has also had a consultation with Dr Mitzi Ren in the past and was virtually pain-free at the time therefore surgical intervention was not recommended  The patient rates his pain a 0 to 1/10 on the numeric pain rating scale  He states his pain is occasional in nature and bothersome at night  He characterizes the pain as burning, throbbing, pressure-like, numbness and pins and needles    I have personally reviewed and/or updated the patient's past medical history, past surgical history, family history, social history, current medications, allergies, and vital signs today  Review of Systems:    Review of Systems   Respiratory: Negative for shortness of breath  Cardiovascular: Negative for chest pain  Gastrointestinal: Negative for constipation, diarrhea, nausea and vomiting  Musculoskeletal: Negative for arthralgias, gait problem, joint swelling and myalgias  Skin: Negative for rash  Neurological: Negative for dizziness, seizures and weakness  All other systems reviewed and are negative  Past Medical History:   Diagnosis Date    Hypertension        No past surgical history on file      Family History   Problem Relation Age of Onset    Hypertension Father     Hypertension Brother        Social History     Occupational History    Not on file   Tobacco Use    Smoking status: Former Smoker    Smokeless tobacco: Never Used    Tobacco comment: quit 2002   Substance and Sexual Activity    Alcohol use: Yes     Frequency: Monthly or less     Drinks per session: 1 or 2     Binge frequency: Never    Drug use: Not Currently    Sexual activity: Not on file         Current Outpatient Medications:     acetaminophen (TYLENOL) 500 mg tablet, Take 500 mg by mouth every 6 (six) hours as needed for mild pain, Disp: , Rfl:     diclofenac sodium (VOLTAREN) 50 mg EC tablet, Take 1 tablet (50 mg total) by mouth 2 (two) times a day, Disp: 180 tablet, Rfl: 0    gabapentin (NEURONTIN) 600 MG tablet, Take 1 PO AM and 2 PO HS, Disp: 270 tablet, Rfl: 0    No Known Allergies    Physical Exam:    /82   Pulse 86   Temp 98 2 °F (36 8 °C)   Ht 5' 6" (1 676 m)   Wt 74 8 kg (165 lb)   BMI 26 63 kg/m²     Constitutional:normal, well developed, well nourished, alert, in no distress and non-toxic and no overt pain behavior  Eyes:anicteric  HEENT:grossly intact  Neck:supple, symmetric, trachea midline and no masses   Pulmonary:even and unlabored  Cardiovascular:No edema or pitting edema present  Skin:Normal without rashes or lesions and well hydrated  Psychiatric:Mood and affect appropriate  Neurologic:Cranial Nerves II-XII grossly intact  Musculoskeletal:normal gait      Imaging  No orders to display     MRI LUMBAR SPINE WITHOUT CONTRAST   INDICATION: Low back pain and right leg radiculopathy for 2 months  COMPARISON: Radiographs of the lumbar spine from April 13, 2020  TECHNIQUE: Sagittal T1, sagittal T2, sagittal inversion recovery, axial T1 and axial T2, coronal T2  Imaging performed on 3 0T MRI IMAGE QUALITY: Diagnostic   FINDINGS:   VERTEBRAL BODIES: There are 5 lumbar type vertebral bodies  Grade 1 anterolisthesis of L5 on S1, unchanged from the prior study  Chronic bilateral L5 pars defects  No lateral subluxation or scoliosis  Normal marrow signal is identified within the   visualized bony structures  No discrete marrow lesion  SACRUM: Normal signal within the sacrum  No evidence of insufficiency or stress fracture  DISTAL CORD AND CONUS: Normal size and signal within the distal cord and conus  PARASPINAL SOFT TISSUES: Paraspinal soft tissues are unremarkable  LOWER THORACIC DISC SPACES: Normal disc height and signal  No disc herniation, canal stenosis or foraminal narrowing  LUMBAR DISC SPACES: Mild disc desiccation and height loss at L4-L5  Moderate disc desiccation and height loss at L5-S1     L1-L2: Normal    L2-L3: Right foraminal and far lateral annular fissure and protrusion  No spinal canal stenosis  Mild right inferior foraminal encroachment  The exiting right L2 nerve root abuts the disc protrusion, correlate for ipsilateral radiculopathy  L3-L4: Right foraminal annular fissure and protrusion abutting the exited right L3 nerve root  There is moderate right foraminal stenosis  Patent spinal canal and left neural foramina  No spinal stenosis  L4-L5: Mild disc height loss  Mild circumferential disc bulge with right foraminal and far lateral annular fissure and protrusion with moderate right foraminal stenosis  Mild to moderate left foraminal stenosis  No spinal stenosis  Bilateral facet   arthropathy with a small amount of inflammatory fluid within the facet joints  L5-S1: Disc space narrowing, uncovering of the posterior disc margin due to the anterolisthesis  Circumferential disc bulge with a left foraminal annular fissure and protrusion  There is severe bilateral foraminal stenosis and L5 nerve root impingement  Spinal canal remains patent  Mild bilateral facet arthropathy  IMPRESSION:   Chronic bilateral L5 spondylolysis and stable grade 1 spondylolisthesis at L5-S1 with advanced degenerative discogenic disease and severe bilateral L5 foraminal stenosis; correlate for bilateral L5 radiculitis  Right-sided foraminal protrusions at L2-L3, L3-L4 and L4-L5, correlate for ipsilateral L2, L3 and L4 radiculopathy  Additional degenerative discogenic disease as described above  No orders of the defined types were placed in this encounter

## 2021-06-11 ENCOUNTER — TELEPHONE (OUTPATIENT)
Dept: PAIN MEDICINE | Facility: CLINIC | Age: 46
End: 2021-06-11

## 2021-06-11 DIAGNOSIS — M54.16 LUMBAR RADICULOPATHY: ICD-10-CM

## 2021-06-11 NOTE — TELEPHONE ENCOUNTER
S/w pt, he is requesting a RF of Gabapentin 600 mg 1 in the AM and 2 at HS  Pt said he is doing good on current dose and denies s/e  Pt would like it sent to [facility]      **Pt does not need a c/b once sent

## 2021-06-11 NOTE — TELEPHONE ENCOUNTER
Pt contacted Call Center requested refill of their medication          Medication Name: gabapentin (NEURONTIN    Dosage of Med: 600 mg       Frequency of Med: 2xs day       Remaining Medication: about a week or 2      Pharmacy and Location:   [address]

## 2021-06-14 RX ORDER — GABAPENTIN 600 MG/1
TABLET ORAL
Qty: 270 TABLET | Refills: 0 | Status: SHIPPED | OUTPATIENT
Start: 2021-06-14 | End: 2021-08-10 | Stop reason: SDUPTHER

## 2021-08-10 ENCOUNTER — OFFICE VISIT (OUTPATIENT)
Dept: PAIN MEDICINE | Facility: CLINIC | Age: 46
End: 2021-08-10
Payer: COMMERCIAL

## 2021-08-10 VITALS — BODY MASS INDEX: 26.95 KG/M2 | DIASTOLIC BLOOD PRESSURE: 78 MMHG | SYSTOLIC BLOOD PRESSURE: 120 MMHG | WEIGHT: 167 LBS

## 2021-08-10 DIAGNOSIS — M51.26 LUMBAR DISC HERNIATION: ICD-10-CM

## 2021-08-10 DIAGNOSIS — G89.4 CHRONIC PAIN SYNDROME: Primary | ICD-10-CM

## 2021-08-10 DIAGNOSIS — M47.816 LUMBAR SPONDYLOSIS: ICD-10-CM

## 2021-08-10 DIAGNOSIS — M54.16 LUMBAR RADICULOPATHY: ICD-10-CM

## 2021-08-10 DIAGNOSIS — M48.062 SPINAL STENOSIS OF LUMBAR REGION WITH NEUROGENIC CLAUDICATION: ICD-10-CM

## 2021-08-10 PROCEDURE — 99214 OFFICE O/P EST MOD 30 MIN: CPT | Performed by: NURSE PRACTITIONER

## 2021-08-10 RX ORDER — GABAPENTIN 600 MG/1
TABLET ORAL
Qty: 315 TABLET | Refills: 0 | Status: SHIPPED | OUTPATIENT
Start: 2021-08-10 | End: 2021-12-01 | Stop reason: SDUPTHER

## 2021-08-10 NOTE — PATIENT INSTRUCTIONS
Epidural Steroid Injection   AMBULATORY CARE:   What you need to know about an epidural steroid injection (JANINA):  An JANINA is a procedure to inject steroid medicine into the epidural space  The epidural space is between your spinal cord and vertebrae  Steroids reduce inflammation and fluid buildup in your spine that may be causing pain  You may be given pain medicine along with the steroids  How to prepare for an JANINA:  Your healthcare provider will talk to you about how to prepare for your procedure  He or she will tell you what medicines to take or not take on the day of your procedure  You may need to stop taking blood thinners or other medicines several days before your procedure  You may need to adjust any diabetes medicine you take on the day of your procedure  Steroid medicine can increase your blood sugar level  Arrange for someone to drive you home when you are discharged  What will happen during an JANINA:   · You will be given medicine to numb the procedure area  You will be awake for the procedure, but you will not feel pain  You may also be given medicine to help you relax  Contrast liquid will be used to help your healthcare provider see the area better  Tell the healthcare provider if you have ever had an allergic reaction to contrast liquid  · Your healthcare provider may place the needle into your neck area, middle of your back, or tailbone area  He may inject the medicine next to the nerves that are causing your pain  He may instead inject the medicine into a larger area of the epidural space  This helps the medicine spread to more nerves  Your healthcare provider will use a fluoroscope to help guide the needle to the right place  A fluoroscope is a type of x-ray  After the procedure, a bandage will be placed over the injection site to prevent infection  What will happen after an JANINA:  You will have a bandage over the injection site to prevent infection   Your healthcare provider will tell you when you can bathe and any activity guidelines  You will be able to go home  Risks of an JANINA:  You may have temporary or permanent nerve damage or paralysis  You may have bleeding or develop a serious infection, such as meningitis (swelling of the brain coverings)  An abscess may also develop  An abscess is a pus-filled area under the skin  You may need surgery to fix the abscess  You may have a seizure, anxiety, or trouble sleeping  If you are a man, you may have temporary erectile dysfunction (not able to have an erection)  Call your local emergency number (911 in the [address] [place] [address],[address]) if:   · You have a seizure  · You have trouble moving your legs  Seek care immediately if:   · Blood soaks through your bandage  · You have a fever or chills, severe back pain, and the procedure area is sensitive to the touch  · You cannot control when you urinate or have a bowel movement  Call your doctor if:   · You have weakness or numbness in your legs  · Your wound is red, swollen, or draining pus  · You have nausea or are vomiting  · Your face or neck is red and you feel warm  · You have more pain than you had before the procedure  · You have swelling in your hands or feet  · You have questions or concerns about your condition or care  Care for your wound as directed: You may remove the bandage before you go to bed the day of your procedure  You may take a shower, but do not take a bath for at least 24 hours  Self-care:   · Do not drive,  use machines, or do strenuous activity for 24 hours after your procedure or as directed  · Continue other treatments  as directed  Steroid injections alone will not control your pain  The injections are meant to be used with other treatments, such as physical therapy  Follow up with your doctor as directed:  Write down your questions so you remember to ask them during your visits     © Copyright [employer] 2021 Information is for End User's use only and may not be sold, redistributed or otherwise used for commercial purposes  All illustrations and images included in CareNotes® are the copyrighted property of [employer]  or Saniya Guthrie  The above information is an  only  It is not intended as medical advice for individual conditions or treatments  Talk to your doctor, nurse or pharmacist before following any medical regimen to see if it is safe and effective for you

## 2021-08-10 NOTE — PROGRESS NOTES
Assessment:  1  Chronic pain syndrome    2  Lumbar radiculopathy    3  Lumbar disc herniation    4  Lumbar spondylosis    5  Spinal stenosis of lumbar region with neurogenic claudication        Plan:  1  I will gently increase gabapentin to 600 mg in the morning, 300 mg in the afternoon and 1200 mg at bedtime for neuropathic complaints  I advised the patient that if they experience any side effects or issues with the changes in their medication regiment, they should give our office a call to discuss  I also advised the patient not to drive or operate machinery until they see how the changes in the medication regimen affects them  The patient was agreeable and verbalized an understanding  2  I will schedule the patient for bilateral L5 TFESI to address the inflammatory component the patient's pain  Complete risks and benefits including bleeding, infection, tissue reaction, nerve injury and allergic reaction were discussed  The patient was agreeable and verbalized an understanding  3  Patient may continue diclofenac 50 mg twice a day p r n  He did ask if on days where he has headaches if he can take the medication 3 times a day since ibuprofen used to be helpful and he cannot take that while on diclofenac  I did explain that 150mg daily is max dosing for this medication  4  Patient may continue Tylenol p r n  should not exceed more than 3000 mg in 24 hours   5  Patient will continue with his home exercise program  6  I will follow up the patient after his procedure or sooner if needed     M*Modal software was used to dictate this note  It may contain errors with dictating incorrect words or incorrect spelling  Please contact the provider directly with any questions  History of Present Illness:     The patient is a 40 y o  male last seen on 5/18/21 who presents for a follow up office visit in regards to chronic back pain  That radiates into the right groin and bilateral thighs and shins with associated neuropathic symptoms in the feet secondary to various degrees of central and foraminal stenosis from L to 3 to L5-S1, lumbar degenerative disc disease, lumbar spondylosis, lumbar spondylolisthesis secondary to lumbar spondylolysis at L5-S1, lumbar radiculopathy and chronic pain syndrome  The patient denies bowel or bladder incontinence or saddle anesthesia  He does feels left lower extremity symptoms are worsening at this time however the do wax and wane  He is taking gabapentin 800 mg daily, diclofenac 50 mg b i d  p r n  and Tylenol p r n  with an 80% improvement of his pain without side effects  He does feel that the symptoms, however, continue to limit him throughout the day especially while at work  He has been evaluated by Dr Mcdonald Woodbury in the past   He rates his pain a 1 to 2/10 on the numeric pain rating scale  States pain is occasional nature bothersome at night  He characterizes the pain as sharp, pressure-like, numbness and pins and needles    I have personally reviewed and/or updated the patient's past medical history, past surgical history, family history, social history, current medications, allergies, and vital signs today  Review of Systems:    Review of Systems   Respiratory: Negative for shortness of breath  Cardiovascular: Negative for chest pain  Gastrointestinal: Negative for constipation, diarrhea, nausea and vomiting  Musculoskeletal: Negative for arthralgias, gait problem, joint swelling and myalgias  Skin: Negative for rash  Neurological: Negative for dizziness, seizures and weakness  All other systems reviewed and are negative  Past Medical History:   Diagnosis Date    Hypertension        No past surgical history on file      Family History   Problem Relation Age of Onset    Hypertension Father     Hypertension Brother        Social History     Occupational History    Not on file   Tobacco Use    Smoking status: Former Smoker    Smokeless tobacco: Never Used    Tobacco comment: quit 2002   Vaping Use    Vaping Use: Never used   Substance and Sexual Activity    Alcohol use: Yes    Drug use: Not Currently    Sexual activity: Not on file         Current Outpatient Medications:     acetaminophen (TYLENOL) 500 mg tablet, Take 500 mg by mouth every 6 (six) hours as needed for mild pain, Disp: , Rfl:     diclofenac sodium (VOLTAREN) 50 mg EC tablet, Take 1 tablet (50 mg total) by mouth 2 (two) times a day, Disp: 180 tablet, Rfl: 0    gabapentin (NEURONTIN) 600 MG tablet, Take 1 PO AM, 0 5 tab PO afternoon and 2 PO HS, Disp: 315 tablet, Rfl: 0    No Known Allergies    Physical Exam:    /78   Wt 75 8 kg (167 lb)   BMI 26 95 kg/m²     Constitutional:normal, well developed, well nourished, alert, in no distress and non-toxic and no overt pain behavior  Eyes:anicteric  HEENT:grossly intact  Neck:supple, symmetric, trachea midline and no masses   Pulmonary:even and unlabored  Cardiovascular:No edema or pitting edema present  Skin:Normal without rashes or lesions and well hydrated  Psychiatric:Mood and affect appropriate  Neurologic:Cranial Nerves II-XII grossly intact  Musculoskeletal:normal gait  Equivocal seated straight leg raise bilaterally      Imaging  FL spine and pain procedure    (Results Pending)       MRI LUMBAR SPINE WITHOUT CONTRAST   INDICATION: Low back pain and right leg radiculopathy for 2 months  COMPARISON: Radiographs of the lumbar spine from April 13, 2020  TECHNIQUE: Sagittal T1, sagittal T2, sagittal inversion recovery, axial T1 and axial T2, coronal T2  Imaging performed on 3 0T MRI IMAGE QUALITY: Diagnostic   FINDINGS:   VERTEBRAL BODIES: There are 5 lumbar type vertebral bodies  Grade 1 anterolisthesis of L5 on S1, unchanged from the prior study  Chronic bilateral L5 pars defects  No lateral subluxation or scoliosis  Normal marrow signal is identified within the   visualized bony structures  No discrete marrow lesion     SACRUM: Normal signal within the sacrum  No evidence of insufficiency or stress fracture  DISTAL CORD AND CONUS: Normal size and signal within the distal cord and conus  PARASPINAL SOFT TISSUES: Paraspinal soft tissues are unremarkable  LOWER THORACIC DISC SPACES: Normal disc height and signal  No disc herniation, canal stenosis or foraminal narrowing  LUMBAR DISC SPACES: Mild disc desiccation and height loss at L4-L5  Moderate disc desiccation and height loss at L5-S1  L1-L2: Normal    L2-L3: Right foraminal and far lateral annular fissure and protrusion  No spinal canal stenosis  Mild right inferior foraminal encroachment  The exiting right L2 nerve root abuts the disc protrusion, correlate for ipsilateral radiculopathy  L3-L4: Right foraminal annular fissure and protrusion abutting the exited right L3 nerve root  There is moderate right foraminal stenosis  Patent spinal canal and left neural foramina  No spinal stenosis  L4-L5: Mild disc height loss  Mild circumferential disc bulge with right foraminal and far lateral annular fissure and protrusion with moderate right foraminal stenosis  Mild to moderate left foraminal stenosis  No spinal stenosis  Bilateral facet   arthropathy with a small amount of inflammatory fluid within the facet joints  L5-S1: Disc space narrowing, uncovering of the posterior disc margin due to the anterolisthesis  Circumferential disc bulge with a left foraminal annular fissure and protrusion  There is severe bilateral foraminal stenosis and L5 nerve root impingement  Spinal canal remains patent  Mild bilateral facet arthropathy  IMPRESSION:   Chronic bilateral L5 spondylolysis and stable grade 1 spondylolisthesis at L5-S1 with advanced degenerative discogenic disease and severe bilateral L5 foraminal stenosis; correlate for bilateral L5 radiculitis  Right-sided foraminal protrusions at L2-L3, L3-L4 and L4-L5, correlate for ipsilateral L2, L3 and L4 radiculopathy  Additional degenerative discogenic disease as described above      Orders Placed This Encounter   Procedures    [facility] spine and pain procedure

## 2021-08-12 ENCOUNTER — TELEPHONE (OUTPATIENT)
Dept: PAIN MEDICINE | Facility: CLINIC | Age: 46
End: 2021-08-12

## 2021-08-12 NOTE — TELEPHONE ENCOUNTER
Patient was contacted and scheduled for B/L L5 TFESI  All pre procedure instructions were given to patient   Nothing to eat or drink for 1 hour prior  Loose fitting clothing   Denies Anti Coag's   NSAIDS okay, ASA okay  Denies Antibx   Needs    Patient instructed to continue all routine medications   Patient instructed to contact our office if becomes sick   Refrain from any vaccines 2 weeks before & 2 weeks after  Insurance auth received but is not a guarantee of payment per your insurance company's authorization disclaimer and it is your responsibility to verify your benefits   COVID -19 screening complete

## 2021-09-08 ENCOUNTER — HOSPITAL ENCOUNTER (OUTPATIENT)
Dept: RADIOLOGY | Facility: CLINIC | Age: 46
Discharge: HOME | End: 2021-09-08
Attending: ANESTHESIOLOGY
Payer: COMMERCIAL

## 2021-09-08 VITALS
TEMPERATURE: 97.3 F | OXYGEN SATURATION: 97 % | SYSTOLIC BLOOD PRESSURE: 148 MMHG | HEART RATE: 83 BPM | RESPIRATION RATE: 20 BRPM | DIASTOLIC BLOOD PRESSURE: 94 MMHG

## 2021-09-08 DIAGNOSIS — M54.16 LUMBAR RADICULOPATHY: ICD-10-CM

## 2021-09-08 PROCEDURE — 64483 NJX AA&/STRD TFRM EPI L/S 1: CPT | Performed by: ANESTHESIOLOGY

## 2021-09-08 RX ORDER — PAPAVERINE HCL 150 MG
20 CAPSULE, EXTENDED RELEASE ORAL ONCE
Status: COMPLETED | OUTPATIENT
Start: 2021-09-08 | End: 2021-09-08

## 2021-09-08 RX ADMIN — LIDOCAINE HYDROCHLORIDE 2 ML: 20 INJECTION, SOLUTION EPIDURAL; INFILTRATION; INTRACAUDAL; PERINEURAL at 13:51

## 2021-09-08 RX ADMIN — IOHEXOL 2 ML: 300 INJECTION, SOLUTION INTRAVENOUS at 13:54

## 2021-09-08 RX ADMIN — DEXAMETHASONE SODIUM PHOSPHATE 15 MG: 10 INJECTION, SOLUTION INTRAMUSCULAR; INTRAVENOUS at 13:55

## 2021-09-08 NOTE — DISCHARGE INSTRUCTIONS
Epidural Steroid Injection   WHAT YOU NEED TO KNOW:   An epidural steroid injection (JANINA) is a procedure to inject steroid medicine into the epidural space  The epidural space is between your spinal cord and vertebrae  Steroids reduce inflammation and fluid buildup in your spine that may be causing pain  You may be given pain medicine along with the steroids  ACTIVITY  · Do not drive or operate machinery today  · No strenuous activity today - bending, lifting, etc   · You may resume normal activites starting tomorrow - start slowly and as tolerated  · You may shower today, but no tub baths or hot tubs  · You may have numbness for several hours from the local anesthetic  Please use caution and common sense, especially with weight-bearing activities  CARE OF THE INJECTION SITE  · If you have soreness or pain, apply ice to the area today (20 minutes on/20 minutes off)  · Starting tomorrow, you may use warm, moist heat or ice if needed  · You may have an increase or change in your discomfort for 36-48 hours after your treatment  · Apply ice and continue with any pain medication you have been prescribed  · [facility]ify [facility] if you have any of the following: redness, drainage, swelling, headache, stiff neck or fever above 100°F     SPECIAL INSTRUCTIONS  · Our office will contact you in approximately 7 days for a progress report  MEDICATIONS  · Continue to take all routine medications  · Our office may have instructed you to hold some medications  As no general anesthesia was used in today's procedure, you should not experience any side effects related to anesthesia  If you have a problem specifically related to your procedure, please call our office at [phone]  Problems not related to your procedure should be directed to your primary care physician

## 2021-09-08 NOTE — H&P
History of Present Illness: The patient is a 40 y o  male who presents with complaints of  Low back and leg pain  Patient Active Problem List   Diagnosis    Acute right-sided low back pain with right-sided sciatica    Lumbar radiculopathy    Elevated blood-pressure reading without diagnosis of hypertension    Mixed hyperlipidemia    IFG (impaired fasting glucose)    Spinal stenosis of lumbar region with neurogenic claudication    Lumbar disc herniation    Lumbar spondylosis    Chronic pain syndrome       Past Medical History:   Diagnosis Date    Hypertension        No past surgical history on file  Current Outpatient Medications:     acetaminophen (TYLENOL) 500 mg tablet, Take 500 mg by mouth every 6 (six) hours as needed for mild pain, Disp: , Rfl:     diclofenac sodium (VOLTAREN) 50 mg EC tablet, Take 1 tablet (50 mg total) by mouth 2 (two) times a day, Disp: 180 tablet, Rfl: 0    gabapentin (NEURONTIN) 600 MG tablet, Take 1 PO AM, 0 5 tab PO afternoon and 2 PO HS, Disp: 315 tablet, Rfl: 0    Current Facility-Administered Medications:     dexamethasone (PF) (DECADRON) injection 20 mg, 20 mg, Epidural, Once, Micky HANNA Rainest, DO    iohexol (OMNIPAQUE) 300 mg/mL injection 50 mL, 50 mL, Epidural, Once, Micky HANNA Will, DO    lidocaine (PF) (XYLOCAINE-MPF) 2 % injection 2 mL, 2 mL, Epidural, Once, Micky Rainest, DO    No Known Allergies    Physical Exam:   Vitals:    09/08/21 1337   BP: (!) 168/109   Pulse: 100   Resp: 20   Temp: (!) 97 3 °F (36 3 °C)   SpO2: 97%     General: Awake, Alert, Oriented x 3, Mood and affect appropriate  Respiratory: Respirations even and unlabored  Cardiovascular: Peripheral pulses intact; no edema  Musculoskeletal Exam:   Bilateral lumbar paraspinals tender to palpation      ASA Score: 2    Patient/Chart Verification  Patient ID Verified: Verbal  ID Band Applied: No  Consents Confirmed: Procedural, To be obtained in the Pre-Procedure area  H&P( within 30 days) Verified: To be obtained in the Pre-Procedure area  Allergies Reviewed: Yes  Anticoag/NSAID held?: NA  Currently on antibiotics?: No    Assessment:   1   Lumbar radiculopathy        Plan: B/L L5 TFESI

## 2021-09-15 ENCOUNTER — TELEPHONE (OUTPATIENT)
Dept: PAIN MEDICINE | Facility: CLINIC | Age: 46
End: 2021-09-15

## 2021-09-17 NOTE — TELEPHONE ENCOUNTER
t returned call he has had 85-90  % improvement , pain level 2/10  Please be advised thank you    Pt can be reached @ [phone]

## 2021-09-22 NOTE — TELEPHONE ENCOUNTER
Patient states 85% of improvement & his symptoms have subsided greatly- pt wants to know the next steps in care- thank you

## 2021-09-28 ENCOUNTER — OFFICE VISIT (OUTPATIENT)
Dept: PAIN MEDICINE | Facility: CLINIC | Age: 46
End: 2021-09-28
Payer: COMMERCIAL

## 2021-09-28 VITALS
DIASTOLIC BLOOD PRESSURE: 69 MMHG | HEIGHT: 66 IN | HEART RATE: 64 BPM | BODY MASS INDEX: 26.2 KG/M2 | WEIGHT: 163 LBS | SYSTOLIC BLOOD PRESSURE: 112 MMHG

## 2021-09-28 DIAGNOSIS — G89.4 CHRONIC PAIN SYNDROME: Primary | ICD-10-CM

## 2021-09-28 DIAGNOSIS — M47.816 LUMBAR SPONDYLOSIS: ICD-10-CM

## 2021-09-28 DIAGNOSIS — M54.16 LUMBAR RADICULOPATHY: ICD-10-CM

## 2021-09-28 DIAGNOSIS — M48.062 SPINAL STENOSIS OF LUMBAR REGION WITH NEUROGENIC CLAUDICATION: ICD-10-CM

## 2021-09-28 DIAGNOSIS — M51.26 LUMBAR DISC HERNIATION: ICD-10-CM

## 2021-09-28 PROCEDURE — 1036F TOBACCO NON-USER: CPT | Performed by: NURSE PRACTITIONER

## 2021-09-28 PROCEDURE — 99214 OFFICE O/P EST MOD 30 MIN: CPT | Performed by: NURSE PRACTITIONER

## 2021-09-28 PROCEDURE — 3008F BODY MASS INDEX DOCD: CPT | Performed by: NURSE PRACTITIONER

## 2021-09-28 NOTE — PROGRESS NOTES
Assessment:  1  Chronic pain syndrome    2  Lumbar radiculopathy    3  Lumbar disc herniation    4  Lumbar spondylosis    5  Spinal stenosis of lumbar region with neurogenic claudication        Plan:  1  We Can repeat bilateral L5 TFESI p r n  I discussed with the patient that since there has been moderate to significant improvement in the pain symptoms, we will hold off on any repeat injections at this point in time  However, I reviewed with the patient that if their symptoms should return or worsen,  they should call our office to schedule to discuss repeating the injection  2  Patient may continue gabapentin and diclofenac as prescribed  He does not require refills today   3  The patient may continue his home exercise program as taught him a physical therapy  4  Patient will follow-up in 3 months or sooner if needed  I attest that I have spent at least 25 minutes face to face with the patient and that at least 50% of the time was spent educating and/or discussing the patient's symptoms and treatment plan options  M*Modal software was used to dictate this note  It may contain errors with dictating incorrect words or incorrect spelling  Please contact the provider directly with any questions  History of Present Illness: The patient is a 40 y o  male last seen on 8/10/21 who presents for a follow up office visit in regards to chronic low back pain with pain and paresthesias with associated numbness and tingling into the bilateral lower extremities secondary to lumbar degenerative disc disease, lumbar spondylosis, lumbar spondylolisthesis, lumbar spondylolysis, lumbar radiculopathy and chronic pain syndrome  The patient denies bowel or bladder incontinence or saddle anesthesia  The patient is status post bilateral L5 TFESI on September 8, 2021    The patient reports an 85 percent improvement of his pain, however he does continue with numbness, tingling and itching sensation in the lower extremities although also somewhat improved  He also continues with axial low back pain which is worse at night  His symptoms do wax and wane depending on his activity level  He takes gabapentin 600/300/1200 and diclofenac 50 milligrams b i d  p r n  without side effects  He rates pain a 1/10 on the numeric pain rating scale  He occasionally has pain at night which he describes as burning, sharp, shooting and numbness    I have personally reviewed and/or updated the patient's past medical history, past surgical history, family history, social history, current medications, allergies, and vital signs today  Review of Systems:    Review of Systems   Respiratory: Negative for shortness of breath  Cardiovascular: Negative for chest pain  Gastrointestinal: Negative for constipation, diarrhea, nausea and vomiting  Musculoskeletal: Negative for arthralgias, joint swelling and myalgias  Skin: Negative for rash  Neurological: Negative for dizziness, seizures and weakness  All other systems reviewed and are negative  Past Medical History:   Diagnosis Date    Hypertension        No past surgical history on file  Family History   Problem Relation Age of Onset    Hypertension Father     Hypertension Brother        Social History     Occupational History    Not on file   Tobacco Use    Smoking status: Former Smoker    Smokeless tobacco: Never Used    Tobacco comment: quit 2002   Vaping Use    Vaping Use: Never used   Substance and Sexual Activity    Alcohol use:  Yes    Drug use: Not Currently    Sexual activity: Not on file         Current Outpatient Medications:     acetaminophen (TYLENOL) 500 mg tablet, Take 500 mg by mouth every 6 (six) hours as needed for mild pain, Disp: , Rfl:     diclofenac sodium (VOLTAREN) 50 mg EC tablet, Take 1 tablet (50 mg total) by mouth 2 (two) times a day, Disp: 180 tablet, Rfl: 0    gabapentin (NEURONTIN) 600 MG tablet, Take 1 PO AM, 0 5 tab PO afternoon and 2 PO HS, Disp: 315 tablet, Rfl: 0    No Known Allergies    Physical Exam:    /69   Pulse 64   Ht 5' 6" (1 676 m)   Wt 73 9 kg (163 lb)   BMI 26 31 kg/m²     Constitutional:normal, well developed, well nourished, alert, in no distress and non-toxic and no overt pain behavior  Eyes:anicteric  HEENT:grossly intact  Neck:supple, symmetric, trachea midline and no masses   Pulmonary:even and unlabored  Cardiovascular:No edema or pitting edema present  Skin:Normal without rashes or lesions and well hydrated  Psychiatric:Mood and affect appropriate  Neurologic:Cranial Nerves II-XII grossly intact  Musculoskeletal:normal gait  Bilateral lumbar paraspinal musculature nontender to palpation  Bilateral SI joints nontender to palpation  Bilateral lower extremity strength 5/5 in all muscle groups  Negative straight leg raise bilaterally  Negative Angel's, Nuvia finger and Gaenslen's test bilaterally      Imaging  No orders to display     MRI LUMBAR SPINE WITHOUT CONTRAST   INDICATION: Low back pain and right leg radiculopathy for 2 months  COMPARISON: Radiographs of the lumbar spine from April 13, 2020  TECHNIQUE: Sagittal T1, sagittal T2, sagittal inversion recovery, axial T1 and axial T2, coronal T2  Imaging performed on 3 0T MRI IMAGE QUALITY: Diagnostic   FINDINGS:   VERTEBRAL BODIES: There are 5 lumbar type vertebral bodies  Grade 1 anterolisthesis of L5 on S1, unchanged from the prior study  Chronic bilateral L5 pars defects  No lateral subluxation or scoliosis  Normal marrow signal is identified within the   visualized bony structures  No discrete marrow lesion  SACRUM: Normal signal within the sacrum  No evidence of insufficiency or stress fracture  DISTAL CORD AND CONUS: Normal size and signal within the distal cord and conus  PARASPINAL SOFT TISSUES: Paraspinal soft tissues are unremarkable     LOWER THORACIC DISC SPACES: Normal disc height and signal  No disc herniation, canal stenosis or foraminal narrowing  LUMBAR DISC SPACES: Mild disc desiccation and height loss at L4-L5  Moderate disc desiccation and height loss at L5-S1  L1-L2: Normal    L2-L3: Right foraminal and far lateral annular fissure and protrusion  No spinal canal stenosis  Mild right inferior foraminal encroachment  The exiting right L2 nerve root abuts the disc protrusion, correlate for ipsilateral radiculopathy  L3-L4: Right foraminal annular fissure and protrusion abutting the exited right L3 nerve root  There is moderate right foraminal stenosis  Patent spinal canal and left neural foramina  No spinal stenosis  L4-L5: Mild disc height loss  Mild circumferential disc bulge with right foraminal and far lateral annular fissure and protrusion with moderate right foraminal stenosis  Mild to moderate left foraminal stenosis  No spinal stenosis  Bilateral facet   arthropathy with a small amount of inflammatory fluid within the facet joints  L5-S1: Disc space narrowing, uncovering of the posterior disc margin due to the anterolisthesis  Circumferential disc bulge with a left foraminal annular fissure and protrusion  There is severe bilateral foraminal stenosis and L5 nerve root impingement  Spinal canal remains patent  Mild bilateral facet arthropathy  IMPRESSION:   Chronic bilateral L5 spondylolysis and stable grade 1 spondylolisthesis at L5-S1 with advanced degenerative discogenic disease and severe bilateral L5 foraminal stenosis; correlate for bilateral L5 radiculitis  Right-sided foraminal protrusions at L2-L3, L3-L4 and L4-L5, correlate for ipsilateral L2, L3 and L4 radiculopathy  Additional degenerative discogenic disease as described above      No orders of the defined types were placed in this encounter

## 2021-12-01 ENCOUNTER — TELEPHONE (OUTPATIENT)
Dept: PAIN MEDICINE | Facility: CLINIC | Age: 46
End: 2021-12-01

## 2021-12-01 DIAGNOSIS — M54.16 LUMBAR RADICULOPATHY: ICD-10-CM

## 2021-12-01 DIAGNOSIS — M51.26 LUMBAR DISC HERNIATION: ICD-10-CM

## 2021-12-01 DIAGNOSIS — M48.062 SPINAL STENOSIS OF LUMBAR REGION WITH NEUROGENIC CLAUDICATION: ICD-10-CM

## 2021-12-01 RX ORDER — GABAPENTIN 600 MG/1
TABLET ORAL
Qty: 315 TABLET | Refills: 1 | Status: SHIPPED | OUTPATIENT
Start: 2021-12-01 | End: 2022-06-01 | Stop reason: SDUPTHER

## 2022-04-21 ENCOUNTER — OFFICE VISIT (OUTPATIENT)
Dept: PAIN MEDICINE | Facility: CLINIC | Age: 47
End: 2022-04-21
Payer: COMMERCIAL

## 2022-04-21 VITALS
DIASTOLIC BLOOD PRESSURE: 75 MMHG | HEIGHT: 66 IN | SYSTOLIC BLOOD PRESSURE: 115 MMHG | HEART RATE: 89 BPM | BODY MASS INDEX: 26.31 KG/M2

## 2022-04-21 DIAGNOSIS — M54.16 LUMBAR RADICULOPATHY: ICD-10-CM

## 2022-04-21 DIAGNOSIS — G89.4 CHRONIC PAIN SYNDROME: Primary | ICD-10-CM

## 2022-04-21 DIAGNOSIS — M47.816 LUMBAR SPONDYLOSIS: ICD-10-CM

## 2022-04-21 DIAGNOSIS — M48.062 SPINAL STENOSIS OF LUMBAR REGION WITH NEUROGENIC CLAUDICATION: ICD-10-CM

## 2022-04-21 DIAGNOSIS — M51.26 LUMBAR DISC HERNIATION: ICD-10-CM

## 2022-04-21 PROCEDURE — 1036F TOBACCO NON-USER: CPT | Performed by: NURSE PRACTITIONER

## 2022-04-21 PROCEDURE — 99214 OFFICE O/P EST MOD 30 MIN: CPT | Performed by: NURSE PRACTITIONER

## 2022-04-21 NOTE — PROGRESS NOTES
Assessment:  1  Chronic pain syndrome    2  Lumbar disc herniation    3  Lumbar radiculopathy    4  Lumbar spondylosis    5  Spinal stenosis of lumbar region with neurogenic claudication        Plan:  1  Patient may continue gabapentin 300/600/1200 and diclofenac 50 mg b i d  p r n  as prescribed  He does not require refills today   2  We can repeat epidural steroid injection p r n  3  The patient will continue with his home exercise program  4  Patient will follow-up in 8 weeks or sooner if needed  M*Modal software was used to dictate this note  It may contain errors with dictating incorrect words or incorrect spelling  Please contact the provider directly with any questions  History of Present Illness: The patient is a 40 y o  male last seen on 9/28/21 who presents for a follow up office visit in regards to chronic low back pain with paresthesias, numbness and tingling in the bilateral lower extremities, right greater than left secondary to lumbar degenerative disc disease, lumbar spondylosis, lumbar spondylolisthesis, lumbar spondylolysis, lumbar radiculopathy, and chronic pain syndrome  The patient denies bowel or bladder incontinence or saddle anesthesia  The patient has had bilateral L5 TFESI in September bladder 2021 which provided 85% relief of his pain  He does continue with neuropathic symptoms in the legs more so than pain such as itching most notably on the top of the feet  He also continues on gabapentin 600/300/1200 diclofenac 50 mg b i d  p r n  with 85% relief without side effects  The patient also has a new complaint of numbness and tingling in his upper extremities in no specific dermatomal distribution  He states that his arms tend to follow sleep at night  He denies any significant pain in his arms  He will intermittently have some neck pain    He Does not feel his symptoms are to the point that he would like to move forward with any type of treatment at this point    The patient rates his pain a 2 5/10 on the numeric pain rating scale  He occasionally has pain at night which is described as pressure-like, numbness and pins and needles    I have personally reviewed and/or updated the patient's past medical history, past surgical history, family history, social history, current medications, allergies, and vital signs today  Review of Systems:    Review of Systems   Respiratory: Negative for shortness of breath  Cardiovascular: Negative for chest pain  Gastrointestinal: Negative for constipation, diarrhea, nausea and vomiting  Musculoskeletal: Negative for arthralgias, gait problem, joint swelling and myalgias  Skin: Negative for rash  Neurological: Negative for dizziness, seizures and weakness  All other systems reviewed and are negative  Past Medical History:   Diagnosis Date    Hypertension        No past surgical history on file  Family History   Problem Relation Age of Onset    Hypertension Father     Hypertension Brother        Social History     Occupational History    Not on file   Tobacco Use    Smoking status: Former Smoker    Smokeless tobacco: Never Used    Tobacco comment: quit 2002   Vaping Use    Vaping Use: Never used   Substance and Sexual Activity    Alcohol use:  Yes    Drug use: Not Currently    Sexual activity: Not on file         Current Outpatient Medications:     acetaminophen (TYLENOL) 500 mg tablet, Take 500 mg by mouth every 6 (six) hours as needed for mild pain, Disp: , Rfl:     diclofenac sodium (VOLTAREN) 50 mg EC tablet, Take 1 tablet (50 mg total) by mouth 2 (two) times a day, Disp: 180 tablet, Rfl: 1    gabapentin (NEURONTIN) 600 MG tablet, Take 1 PO AM, 0 5 tab PO afternoon and 2 PO HS, Disp: 315 tablet, Rfl: 1    No Known Allergies    Physical Exam:    /75   Pulse 89   Ht 5' 6" (1 676 m)   BMI 26 31 kg/m²     Constitutional:normal, well developed, well nourished, alert, in no distress and non-toxic and no overt pain behavior  Eyes:anicteric  HEENT:grossly intact  Neck:supple, symmetric, trachea midline and no masses   Pulmonary:even and unlabored  Cardiovascular:No edema or pitting edema present  Skin:Normal without rashes or lesions and well hydrated  Psychiatric:Mood and affect appropriate  Neurologic:Cranial Nerves II-XII grossly intact  Musculoskeletal:normal gait      Imaging  No orders to display     MRI LUMBAR SPINE WITHOUT CONTRAST   INDICATION: Low back pain and right leg radiculopathy for 2 months  COMPARISON: Radiographs of the lumbar spine from April 13, 2020  TECHNIQUE: Sagittal T1, sagittal T2, sagittal inversion recovery, axial T1 and axial T2, coronal T2  Imaging performed on 3 0T MRI IMAGE QUALITY: Diagnostic   FINDINGS:   VERTEBRAL BODIES: There are 5 lumbar type vertebral bodies  Grade 1 anterolisthesis of L5 on S1, unchanged from the prior study  Chronic bilateral L5 pars defects  No lateral subluxation or scoliosis  Normal marrow signal is identified within the   visualized bony structures  No discrete marrow lesion  SACRUM: Normal signal within the sacrum  No evidence of insufficiency or stress fracture  DISTAL CORD AND CONUS: Normal size and signal within the distal cord and conus  PARASPINAL SOFT TISSUES: Paraspinal soft tissues are unremarkable  LOWER THORACIC DISC SPACES: Normal disc height and signal  No disc herniation, canal stenosis or foraminal narrowing  LUMBAR DISC SPACES: Mild disc desiccation and height loss at L4-L5  Moderate disc desiccation and height loss at L5-S1  L1-L2: Normal    L2-L3: Right foraminal and far lateral annular fissure and protrusion  No spinal canal stenosis  Mild right inferior foraminal encroachment  The exiting right L2 nerve root abuts the disc protrusion, correlate for ipsilateral radiculopathy  L3-L4: Right foraminal annular fissure and protrusion abutting the exited right L3 nerve root   There is moderate right foraminal stenosis  Patent spinal canal and left neural foramina  No spinal stenosis  L4-L5: Mild disc height loss  Mild circumferential disc bulge with right foraminal and far lateral annular fissure and protrusion with moderate right foraminal stenosis  Mild to moderate left foraminal stenosis  No spinal stenosis  Bilateral facet   arthropathy with a small amount of inflammatory fluid within the facet joints  L5-S1: Disc space narrowing, uncovering of the posterior disc margin due to the anterolisthesis  Circumferential disc bulge with a left foraminal annular fissure and protrusion  There is severe bilateral foraminal stenosis and L5 nerve root impingement  Spinal canal remains patent  Mild bilateral facet arthropathy  IMPRESSION:   Chronic bilateral L5 spondylolysis and stable grade 1 spondylolisthesis at L5-S1 with advanced degenerative discogenic disease and severe bilateral L5 foraminal stenosis; correlate for bilateral L5 radiculitis  Right-sided foraminal protrusions at L2-L3, L3-L4 and L4-L5, correlate for ipsilateral L2, L3 and L4 radiculopathy  Additional degenerative discogenic disease as described above  No orders of the defined types were placed in this encounter

## 2022-06-01 ENCOUNTER — TELEPHONE (OUTPATIENT)
Dept: PAIN MEDICINE | Facility: CLINIC | Age: 47
End: 2022-06-01

## 2022-06-01 DIAGNOSIS — M48.062 SPINAL STENOSIS OF LUMBAR REGION WITH NEUROGENIC CLAUDICATION: ICD-10-CM

## 2022-06-01 DIAGNOSIS — M51.26 LUMBAR DISC HERNIATION: ICD-10-CM

## 2022-06-01 DIAGNOSIS — M54.16 LUMBAR RADICULOPATHY: ICD-10-CM

## 2022-06-01 RX ORDER — GABAPENTIN 600 MG/1
TABLET ORAL
Qty: 315 TABLET | Refills: 0 | Status: SHIPPED | OUTPATIENT
Start: 2022-06-01 | End: 2022-06-16 | Stop reason: SDUPTHER

## 2022-06-01 NOTE — TELEPHONE ENCOUNTER
Looks like last ordered on 12/1/21 for 6 months worth  His next ovs is on 6/16/22 with you  Please advise   Thanks

## 2022-06-01 NOTE — TELEPHONE ENCOUNTER
REFILL REQUEST  Gabapentin 600mg  600mg in am 300 in pm, 1200 at night  Remainin week left  [id number] [place], [place] - [address] [facility]  Phone:  [phone]      Diclofenac sodium 50mg  Take 1 tablet (50 mg total) by mouth 2 (two) times a day  Remainin week  [id number] [address] [facility]   Phone:  [phone]

## 2022-06-16 ENCOUNTER — OFFICE VISIT (OUTPATIENT)
Dept: PAIN MEDICINE | Facility: CLINIC | Age: 47
End: 2022-06-16
Payer: COMMERCIAL

## 2022-06-16 VITALS
OXYGEN SATURATION: 97 % | HEIGHT: 66 IN | WEIGHT: 166.6 LBS | BODY MASS INDEX: 26.78 KG/M2 | HEART RATE: 78 BPM | SYSTOLIC BLOOD PRESSURE: 147 MMHG | DIASTOLIC BLOOD PRESSURE: 95 MMHG | TEMPERATURE: 98.1 F

## 2022-06-16 DIAGNOSIS — G89.4 CHRONIC PAIN SYNDROME: Primary | ICD-10-CM

## 2022-06-16 DIAGNOSIS — M51.26 LUMBAR DISC HERNIATION: ICD-10-CM

## 2022-06-16 DIAGNOSIS — M54.16 LUMBAR RADICULOPATHY: ICD-10-CM

## 2022-06-16 DIAGNOSIS — M48.062 SPINAL STENOSIS OF LUMBAR REGION WITH NEUROGENIC CLAUDICATION: ICD-10-CM

## 2022-06-16 PROCEDURE — 99214 OFFICE O/P EST MOD 30 MIN: CPT | Performed by: ANESTHESIOLOGY

## 2022-06-16 RX ORDER — GABAPENTIN 600 MG/1
TABLET ORAL
Qty: 315 TABLET | Refills: 1 | Status: SHIPPED | OUTPATIENT
Start: 2022-06-16

## 2022-06-16 NOTE — PROGRESS NOTES
Assessment  1  Chronic pain syndrome    2  Lumbar radiculopathy    3  Lumbar disc herniation    4  Spinal stenosis of lumbar region with neurogenic claudication        Plan  43-year-old male with a history of chronic pain syndrome, lumbar degenerative disc disease, spondylosis, spondylolisthesis, stenosis, and lumbar radiculopathy returning for follow-up  The patient continues to have low back pain that radiates into bilateral lower extremities with associated numbness and paresthesias, however his symptoms are well controlled currently  He is currently taking gabapentin 600/300/1200 and diclofenac 50 mg b i d  p r n  with approximately 85% of relief  The patient does state that occasionally he will not have time to take his afternoon dose of gabapentin secondary to work  He does notice the neuropathic symptoms in his lower extremities worsen when he misses this dose  The patient does have some erectile dysfunction, particularly with maintaining an erection since starting gabapentin which can certainly be caused by the gabapentin  I did discuss weaning off of the gabapentin and trialing a different medication which the patient declined as he finds good relief with this medication  The patient may consider speaking with PCP about starting phosphatase esterase inhibitor to help maintain an erection  The patient stated that he will give this some thought  Otherwise the patient is not noting any other side effects with these medications  He has had excellent relief with bilateral L5 TFESI which was completed in September 2021  He is still experiencing ongoing relief at this time  1  Advised patient to change his gabapentin dosing to 900 mg in the morning and 1200 mg at bedtime since he is missing his afternoon dose  Refill of gabapentin was provided today  2  Patient will continue with diclofenac 50 mg b i d  p r n  and the patient was given a refill today  3   We will repeat JANINA p r n   4  Patient will continue with his home exercise program  5  I will follow up with the patient in 3 months      My impressions and treatment recommendations were discussed in detail with the patient who verbalized understanding and had no further questions  Discharge instructions were provided  I personally saw and examined the patient and I agree with the above discussed plan of care  No orders of the defined types were placed in this encounter  New Medications Ordered This Visit   Medications    diclofenac sodium (VOLTAREN) 50 mg EC tablet     Sig: Take 1 tablet (50 mg total) by mouth 2 (two) times a day     Dispense:  180 tablet     Refill:  1    gabapentin (NEURONTIN) 600 MG tablet     Sig: Take 1 PO AM, 0 5 tab PO afternoon and 2 PO HS     Dispense:  315 tablet     Refill:  1       History of Present Illness    Javier Pedersen is a 56 y o  male with a history of chronic pain syndrome, lumbar degenerative disc disease, spondylosis, spondylolisthesis, stenosis, and lumbar radiculopathy returning for follow-up  The patient continues to have low back pain that radiates into bilateral lower extremities with associated numbness and paresthesias, however his symptoms are well controlled currently  He denies any bladder or bowel incontinence or saddle anesthesia  He is currently taking gabapentin 600/300/1200 and diclofenac 50 mg b i d  p r n  with approximately 85% of relief  The patient does state that occasionally he will not have time to take his afternoon dose of gabapentin secondary to work  He does notice the neuropathic symptoms in his lower extremities worsen when he misses this dose  He has had excellent relief with bilateral L5 TFESI which was completed in September 2021  He is still experiencing ongoing relief at this time  The patient rates his pain a 1 to 2/10 and the pain is worse at night  The pain is occasional and described as throbbing, pressure-like, numbness, and pins and needles    The pain is worse with standing, walking, bending, and exercise  Pain is alleviated with medication, injections, and relaxation  Other than as stated above, the patient denies any interval changes in medications, medical condition, mental condition, symptoms, or allergies since the last office visit  I have personally reviewed and/or updated the patient's past medical history, past surgical history, family history, social history, current medications, allergies, and vital signs today  Review of Systems    Patient Active Problem List   Diagnosis    Acute right-sided low back pain with right-sided sciatica    Lumbar radiculopathy    Elevated blood-pressure reading without diagnosis of hypertension    Mixed hyperlipidemia    IFG (impaired fasting glucose)    Spinal stenosis of lumbar region with neurogenic claudication    Lumbar disc herniation    Lumbar spondylosis    Chronic pain syndrome       Past Medical History:   Diagnosis Date    Hypertension        History reviewed  No pertinent surgical history  Family History   Problem Relation Age of Onset    Hypertension Father     Hypertension Brother        Social History     Occupational History    Not on file   Tobacco Use    Smoking status: Former Smoker    Smokeless tobacco: Never Used    Tobacco comment: quit 2002   Vaping Use    Vaping Use: Never used   Substance and Sexual Activity    Alcohol use: Yes    Drug use: Not Currently    Sexual activity: Not on file       Current Outpatient Medications on File Prior to Visit   Medication Sig    acetaminophen (TYLENOL) 500 mg tablet Take 500 mg by mouth every 6 (six) hours as needed for mild pain    [DISCONTINUED] diclofenac sodium (VOLTAREN) 50 mg EC tablet Take 1 tablet (50 mg total) by mouth 2 (two) times a day    [DISCONTINUED] gabapentin (NEURONTIN) 600 MG tablet Take 1 PO AM, 0 5 tab PO afternoon and 2 PO HS     No current facility-administered medications on file prior to visit  No Known Allergies    Physical Exam    /95 (BP Location: Right arm, Patient Position: Sitting, Cuff Size: Standard)   Pulse 78   Temp 98 1 °F (36 7 °C) (Oral)   Ht 5' 6" (1 676 m)   Wt 75 6 kg (166 lb 9 6 oz)   SpO2 97%   BMI 26 89 kg/m²     Constitutional: normal, well developed, well nourished, alert, in no distress and non-toxic and no overt pain behavior  Eyes: anicteric  HEENT: grossly intact  Neck: supple, symmetric, trachea midline and no masses   Pulmonary:even and unlabored  Cardiovascular:No edema or pitting edema present  Skin:Normal without rashes or lesions and well hydrated  Psychiatric:Mood and affect appropriate  Neurologic:Cranial Nerves II-XII grossly intact  Musculoskeletal:normal gait  Bilateral lumbar paraspinals minimally tender to palpation from L4-L5  Bilateral SI joints nontender to palpation  Bilateral lower extremity strength 5/5 in all muscle groups  Sensation intact to light touch in L3 through S1 dermatomes bilaterally  Negative seated straight leg raise bilaterally  Imaging  MRI LUMBAR SPINE WITHOUT CONTRAST     INDICATION: Low back pain and right leg radiculopathy for 2 months      COMPARISON:  Radiographs of the lumbar spine from April 13, 2020      TECHNIQUE:  Sagittal T1, sagittal T2, sagittal inversion recovery, axial T1 and axial T2, coronal T2  Imaging performed on 3 0T MRI  IMAGE QUALITY:  Diagnostic     FINDINGS:     VERTEBRAL BODIES:  There are 5 lumbar type vertebral bodies  Grade 1 anterolisthesis of L5 on S1, unchanged from the prior study  Chronic bilateral L5 pars defects  No lateral subluxation or scoliosis  Normal marrow signal is identified within the   visualized bony structures  No discrete marrow lesion      SACRUM:  Normal signal within the sacrum   No evidence of insufficiency or stress fracture      DISTAL CORD AND CONUS:  Normal size and signal within the distal cord and conus      PARASPINAL SOFT TISSUES:  Paraspinal soft tissues are unremarkable      LOWER THORACIC DISC SPACES:  Normal disc height and signal   No disc herniation, canal stenosis or foraminal narrowing      LUMBAR DISC SPACES:  Mild disc desiccation and height loss at L4-L5  Moderate disc desiccation and height loss at L5-S1      L1-L2:  Normal      L2-L3:  Right foraminal and far lateral annular fissure and protrusion  No spinal canal stenosis  Mild right inferior foraminal encroachment  The exiting right L2 nerve root abuts the disc protrusion, correlate for ipsilateral radiculopathy      L3-L4:  Right foraminal annular fissure and protrusion abutting the exited right L3 nerve root  There is moderate right foraminal stenosis  Patent spinal canal and left neural foramina  No spinal stenosis      L4-L5:  Mild disc height loss  Mild circumferential disc bulge with right foraminal and far lateral annular fissure and protrusion with moderate right foraminal stenosis  Mild to moderate left foraminal stenosis  No spinal stenosis  Bilateral facet   arthropathy with a small amount of inflammatory fluid within the facet joints      L5-S1:  Disc space narrowing, uncovering of the posterior disc margin due to the anterolisthesis  Circumferential disc bulge with a left foraminal annular fissure and protrusion  There is severe bilateral foraminal stenosis and L5 nerve root impingement  Spinal canal remains patent  Mild bilateral facet arthropathy      IMPRESSION:     Chronic bilateral L5 spondylolysis and stable grade 1 spondylolisthesis at L5-S1 with advanced degenerative discogenic disease and severe bilateral L5 foraminal stenosis; correlate for bilateral L5 radiculitis      Right-sided foraminal protrusions at L2-L3, L3-L4 and L4-L5, correlate for ipsilateral L2, L3 and L4 radiculopathy      Additional degenerative discogenic disease as described above

## 2022-07-28 ENCOUNTER — OFFICE VISIT (OUTPATIENT)
Dept: URGENT CARE | Age: 47
End: 2022-07-28
Payer: COMMERCIAL

## 2022-07-28 VITALS
OXYGEN SATURATION: 98 % | SYSTOLIC BLOOD PRESSURE: 149 MMHG | DIASTOLIC BLOOD PRESSURE: 90 MMHG | TEMPERATURE: 98 F | RESPIRATION RATE: 18 BRPM | HEART RATE: 86 BPM

## 2022-07-28 DIAGNOSIS — M79.672 PAIN OF LEFT HEEL: Primary | ICD-10-CM

## 2022-07-28 PROCEDURE — G0382 LEV 3 HOSP TYPE B ED VISIT: HCPCS

## 2022-07-28 PROCEDURE — S9083 URGENT CARE CENTER GLOBAL: HCPCS

## 2022-07-28 NOTE — LETTER
July 28, 2022     Patient: Love Birch   YOB: 1975   Date of Visit: 7/28/2022       To Whom it May Concern:    Love Birch was seen in my clinic on 7/28/2022  He may return on 7/30/2022  If you have any questions or concerns, please don't hesitate to call           Sincerely,          ZACHERY Dominguez        CC: No Recipients

## 2022-07-28 NOTE — PROGRESS NOTES
[id number]        NAME: Etelvina Craig is a 56 y o  male  : 1975    MRN: [id number]  DATE: 2022  TIME: 11:10 AM    Assessment and Plan   Pain of left heel [M79 [id number]]  1  Pain of left heel     59-year-old male presents for evaluation of left heel pain since Monday  Recommend continued rest, ice, Tylenol and Voltaren  Patient reports that he has purchased insoles for his boots  May return to work on Saturday  Patient Instructions   Follow-up with primary care provider if symptoms continue  Follow up with PCP in 3-5 days  Proceed to  ER if symptoms worsen  Chief Complaint     Chief Complaint   Patient presents with    Heel Pain     Left          History of Present Illness       Patient is a 59-year-old male with no major medical history presents for evaluation of left heel pain since Monday  Who reports that he stands regularly for his job, and on Monday he was wearing new boots and stood for a full 9 hour shift  Tuesday he noticed left heel pain while walking in the grocery store, which progressed into yesterday  He does report that the pain is resolving, as he only works ,  and   He takes gabapentin and Voltaren for his back at baseline, and so he has not been taking any extra medications for his foot, although he will occasionally take Tylenol  He denies trauma, swelling, skin color change, fever, gait problem  He does note that he only has pain when weight-bearing on his left foot  Review of Systems   Review of Systems   Constitutional: Negative for chills and fever  HENT: Negative for congestion, ear pain, postnasal drip, rhinorrhea and sore throat  Eyes: Negative for pain and visual disturbance  Respiratory: Negative for cough and shortness of breath  Cardiovascular: Negative for chest pain and palpitations  Gastrointestinal: Negative for abdominal pain, diarrhea, nausea and vomiting     Genitourinary: Negative for dysuria and hematuria  Musculoskeletal: Positive for arthralgias  Negative for back pain, gait problem, joint swelling, myalgias, neck pain and neck stiffness  Skin: Negative for color change and rash  Allergic/Immunologic: Negative  Negative for environmental allergies  Neurological: Negative for dizziness, seizures, syncope, facial asymmetry, light-headedness and headaches  Hematological: Negative  Psychiatric/Behavioral: Negative  All other systems reviewed and are negative  Current Medications       Current Outpatient Medications:     acetaminophen (TYLENOL) 500 mg tablet, Take 500 mg by mouth every 6 (six) hours as needed for mild pain, Disp: , Rfl:     diclofenac sodium (VOLTAREN) 50 mg EC tablet, Take 1 tablet (50 mg total) by mouth 2 (two) times a day, Disp: 180 tablet, Rfl: 1    gabapentin (NEURONTIN) 600 MG tablet, Take 1 PO AM, 0 5 tab PO afternoon and 2 PO HS, Disp: 315 tablet, Rfl: 1    Current Allergies     Allergies as of 07/28/2022    (No Known Allergies)            The following portions of the patient's history were reviewed and updated as appropriate: allergies, current medications, past family history, past medical history, past social history, past surgical history and problem list      Past Medical History:   Diagnosis Date    Hypertension        No past surgical history on file  Family History   Problem Relation Age of Onset    Hypertension Father     Hypertension Brother          Medications have been verified  Objective   /90   Pulse 86   Temp 98 °F (36 7 °C)   Resp 18   SpO2 98%        Physical Exam     Physical Exam  Constitutional:       General: He is not in acute distress  Appearance: Normal appearance  He is not ill-appearing, toxic-appearing or diaphoretic  HENT:      Head: Normocephalic and atraumatic        Nose: Nose normal       Mouth/Throat:      Mouth: Mucous membranes are moist    Eyes:      Extraocular Movements: Extraocular movements intact  Pupils: Pupils are equal, round, and reactive to light  Cardiovascular:      Rate and Rhythm: Normal rate and regular rhythm  Pulses: Normal pulses  Dorsalis pedis pulses are 2+ on the left side  Heart sounds: Normal heart sounds  Pulmonary:      Effort: Pulmonary effort is normal       Breath sounds: Normal breath sounds  Musculoskeletal:         General: Normal range of motion  Cervical back: Normal range of motion and neck supple  No rigidity or tenderness  Left foot: Normal  Normal range of motion and normal capillary refill  No swelling, deformity, bunion, Charcot foot, foot drop, prominent metatarsal heads, laceration, tenderness, bony tenderness or crepitus  Normal pulse  Comments: Left heel nontender to palpation  Feet:      Left foot:      Skin integrity: Skin integrity normal       Toenail Condition: Left toenails are normal    Skin:     General: Skin is warm and dry  Capillary Refill: Capillary refill takes less than 2 seconds  Findings: No erythema  Neurological:      General: No focal deficit present  Mental Status: He is alert     Psychiatric:         Mood and Affect: Mood normal

## 2022-09-15 ENCOUNTER — OFFICE VISIT (OUTPATIENT)
Dept: PAIN MEDICINE | Facility: CLINIC | Age: 47
End: 2022-09-15
Payer: COMMERCIAL

## 2022-09-15 VITALS
BODY MASS INDEX: 25.71 KG/M2 | HEIGHT: 66 IN | SYSTOLIC BLOOD PRESSURE: 134 MMHG | DIASTOLIC BLOOD PRESSURE: 88 MMHG | HEART RATE: 76 BPM | WEIGHT: 160 LBS

## 2022-09-15 DIAGNOSIS — G89.4 CHRONIC PAIN SYNDROME: Primary | ICD-10-CM

## 2022-09-15 DIAGNOSIS — M48.062 SPINAL STENOSIS OF LUMBAR REGION WITH NEUROGENIC CLAUDICATION: ICD-10-CM

## 2022-09-15 DIAGNOSIS — M54.41 ACUTE RIGHT-SIDED LOW BACK PAIN WITH RIGHT-SIDED SCIATICA: ICD-10-CM

## 2022-09-15 DIAGNOSIS — M51.26 LUMBAR DISC HERNIATION: ICD-10-CM

## 2022-09-15 DIAGNOSIS — M47.816 LUMBAR SPONDYLOSIS: ICD-10-CM

## 2022-09-15 DIAGNOSIS — M54.16 LUMBAR RADICULOPATHY: ICD-10-CM

## 2022-09-15 PROCEDURE — 99214 OFFICE O/P EST MOD 30 MIN: CPT | Performed by: NURSE PRACTITIONER

## 2022-09-15 RX ORDER — GABAPENTIN 600 MG/1
TABLET ORAL
Qty: 315 TABLET | Refills: 1 | Status: SHIPPED | OUTPATIENT
Start: 2022-09-15

## 2022-09-15 NOTE — PROGRESS NOTES
Assessment:  1  Chronic pain syndrome    2  Lumbar radiculopathy    3  Lumbar disc herniation    4  Acute right-sided low back pain with right-sided sciatica    5  Lumbar spondylosis    6  Spinal stenosis of lumbar region with neurogenic claudication        Plan:  1  Patient may continue gabapentin and diclofenac as prescribed  Both these medications were refilled today  2  We can repeat bilateral L5 TFESI p r n  3  Patient will continue with his home exercise program  4  Follow-up in 6 months or sooner if needed    History of Present Illness: The patient is a 56 y o  male last seen on 6/16/22 who presents for a follow up office visit in regards to chronic low back pain that radiates into the bilateral lower extremities associated paresthesias secondary to lumbar degenerative disc disease, lumbar spondylosis, lumbar spondylolisthesis, lumbar radiculopathy and chronic pain syndrome  The patient currently reports that his symptoms are currently well controlled  He continues on gabapentin 900 mg in the morning and 1200 mg at bedtime and diclofenac 50 mg b i d  p r n  with 85% improvement of his pain without side effects  He has had excellent relief in the past with bilateral L5 TFESI which is ongoing at this time  Rates his pain a 1/10 on the numeric pain rating scale  He intermittently has pain in the evening which is described as throbbing, pressure-like, numbness and pins and needles  He states he did try to wean off of diclofenac, however radicular symptoms significantly returned and resolved once he started taking the medication again    I have personally reviewed and/or updated the patient's past medical history, past surgical history, family history, social history, current medications, allergies, and vital signs today  Review of Systems:    Review of Systems   Respiratory: Negative for shortness of breath  Cardiovascular: Negative for chest pain     Gastrointestinal: Negative for constipation, diarrhea, nausea and vomiting  Musculoskeletal: Negative for arthralgias, gait problem, joint swelling and myalgias  Skin: Negative for rash  Neurological: Negative for dizziness, seizures and weakness  All other systems reviewed and are negative  Past Medical History:   Diagnosis Date    Hypertension        No past surgical history on file  Family History   Problem Relation Age of Onset    Hypertension Father     Hypertension Brother        Social History     Occupational History    Not on file   Tobacco Use    Smoking status: Former Smoker    Smokeless tobacco: Never Used    Tobacco comment: quit 2002   Vaping Use    Vaping Use: Never used   Substance and Sexual Activity    Alcohol use: Yes    Drug use: Not Currently    Sexual activity: Not on file         Current Outpatient Medications:     acetaminophen (TYLENOL) 500 mg tablet, Take 500 mg by mouth every 6 (six) hours as needed for mild pain, Disp: , Rfl:     diclofenac sodium (VOLTAREN) 50 mg EC tablet, Take 1 tablet (50 mg total) by mouth 2 (two) times a day, Disp: 180 tablet, Rfl: 1    gabapentin (NEURONTIN) 600 MG tablet, Take 1 PO AM, 0 5 tab PO afternoon and 2 PO HS, Disp: 315 tablet, Rfl: 1    No Known Allergies    Physical Exam:    /88   Pulse 76   Ht 5' 6" (1 676 m)   Wt 72 6 kg (160 lb)   BMI 25 82 kg/m²     Constitutional:normal, well developed, well nourished, alert, in no distress and non-toxic and no overt pain behavior    Eyes:anicteric  HEENT:grossly intact  Neck:supple, symmetric, trachea midline and no masses   Pulmonary:even and unlabored  Cardiovascular:No edema or pitting edema present  Skin:Normal without rashes or lesions and well hydrated  Psychiatric:Mood and affect appropriate  Neurologic:Cranial Nerves II-XII grossly intact  Musculoskeletal:normal gait      Imaging  No orders to display   MRI LUMBAR SPINE WITHOUT CONTRAST   INDICATION: Low back pain and right leg radiculopathy for 2 months  COMPARISON: Radiographs of the lumbar spine from April 13, 2020  TECHNIQUE: Sagittal T1, sagittal T2, sagittal inversion recovery, axial T1 and axial T2, coronal T2  Imaging performed on 3 0T MRI IMAGE QUALITY: Diagnostic   FINDINGS:   VERTEBRAL BODIES: There are 5 lumbar type vertebral bodies  Grade 1 anterolisthesis of L5 on S1, unchanged from the prior study  Chronic bilateral L5 pars defects  No lateral subluxation or scoliosis  Normal marrow signal is identified within the   visualized bony structures  No discrete marrow lesion  SACRUM: Normal signal within the sacrum  No evidence of insufficiency or stress fracture  DISTAL CORD AND CONUS: Normal size and signal within the distal cord and conus  PARASPINAL SOFT TISSUES: Paraspinal soft tissues are unremarkable  LOWER THORACIC DISC SPACES: Normal disc height and signal  No disc herniation, canal stenosis or foraminal narrowing  LUMBAR DISC SPACES: Mild disc desiccation and height loss at L4-L5  Moderate disc desiccation and height loss at L5-S1  L1-L2: Normal    L2-L3: Right foraminal and far lateral annular fissure and protrusion  No spinal canal stenosis  Mild right inferior foraminal encroachment  The exiting right L2 nerve root abuts the disc protrusion, correlate for ipsilateral radiculopathy  L3-L4: Right foraminal annular fissure and protrusion abutting the exited right L3 nerve root  There is moderate right foraminal stenosis  Patent spinal canal and left neural foramina  No spinal stenosis  L4-L5: Mild disc height loss  Mild circumferential disc bulge with right foraminal and far lateral annular fissure and protrusion with moderate right foraminal stenosis  Mild to moderate left foraminal stenosis  No spinal stenosis  Bilateral facet   arthropathy with a small amount of inflammatory fluid within the facet joints  L5-S1: Disc space narrowing, uncovering of the posterior disc margin due to the anterolisthesis  Circumferential disc bulge with a left foraminal annular fissure and protrusion  There is severe bilateral foraminal stenosis and L5 nerve root impingement  Spinal canal remains patent  Mild bilateral facet arthropathy  IMPRESSION:   Chronic bilateral L5 spondylolysis and stable grade 1 spondylolisthesis at L5-S1 with advanced degenerative discogenic disease and severe bilateral L5 foraminal stenosis; correlate for bilateral L5 radiculitis  Right-sided foraminal protrusions at L2-L3, L3-L4 and L4-L5, correlate for ipsilateral L2, L3 and L4 radiculopathy  Additional degenerative discogenic disease as described above  No orders of the defined types were placed in this encounter

## 2022-12-19 ENCOUNTER — TELEPHONE (OUTPATIENT)
Dept: PAIN MEDICINE | Facility: CLINIC | Age: 47
End: 2022-12-19

## 2022-12-19 NOTE — TELEPHONE ENCOUNTER
Caller: Angela Powell     Doctor: Ras Young     Reason for call: patient is ready to schedule next procedure pain level 5/10    Call back#: [phone]

## 2023-03-01 NOTE — PROGRESS NOTES
Assessment:  1  Chronic pain syndrome    2  Encounter for laboratory examination    3  Lumbar radiculopathy    4  Lumbar disc herniation    5  Spinal stenosis of lumbar region with neurogenic claudication        Plan:  1  Patient may continue gabapentin and diclofenac as prescribed  Both of these medications were refilled today  Given the longevity of diclofenac, I will order a BMP to evaluate kidney function  2  We can repeat bilateral L5 TFESI as needed  3  Continue with home exercise program  4  Follow-up in 6 months or sooner if needed    History of Present Illness: The patient is a 56 y o  male last seen on 09/15/2022 who presents for a follow up office visit in regards to chronic back pain that radiates into the bilateral lower extremities with associated paresthesia  Patient denies bowel or bladder incontinence or saddle anesthesia  Patient states that since last office visit, his pain is actually been improving even further  He continues on gabapentin 900 mg in the morning and 1200 mg at bedtime and diclofenac 50 mg twice daily as needed with a 90% improvement of his pain without side effects  He rates his pain a 0-1 out of 10 on the numeric pain rating scale  He occasionally has pain which is described as burning, dull aching and pressure-like  He has had good relief in the past with bilateral L5 TFESI  We did discuss stopping diclofenac however he states he is hesitant because when he has stopped in the past his pain has worsened  I have personally reviewed and/or updated the patient's past medical history, past surgical history, family history, social history, current medications, allergies, and vital signs today  Review of Systems:    Review of Systems   Respiratory: Negative for shortness of breath  Cardiovascular: Negative for chest pain  Gastrointestinal: Negative for constipation, diarrhea, nausea and vomiting     Musculoskeletal: Negative for arthralgias, gait problem, joint swelling and myalgias  Skin: Negative for rash  Neurological: Negative for dizziness, seizures and weakness  All other systems reviewed and are negative  Past Medical History:   Diagnosis Date   • Hypertension        No past surgical history on file  Family History   Problem Relation Age of Onset   • Hypertension Father    • Hypertension Brother        Social History     Occupational History   • Not on file   Tobacco Use   • Smoking status: Former   • Smokeless tobacco: Never   • Tobacco comments:     quit 2002   Vaping Use   • Vaping Use: Never used   Substance and Sexual Activity   • Alcohol use: Yes   • Drug use: Not Currently   • Sexual activity: Not on file         Current Outpatient Medications:   •  acetaminophen (TYLENOL) 500 mg tablet, Take 500 mg by mouth every 6 (six) hours as needed for mild pain, Disp: , Rfl:   •  diclofenac sodium (VOLTAREN) 50 mg EC tablet, Take 1 tablet (50 mg total) by mouth 2 (two) times a day, Disp: 180 tablet, Rfl: 1  •  gabapentin (NEURONTIN) 600 MG tablet, Take 1 5 PO AM and 2 PO HS, Disp: 315 tablet, Rfl: 1    No Known Allergies    Physical Exam:    /89   Pulse 96   Ht 5' 6" (1 676 m)   Wt 72 6 kg (160 lb)   BMI 25 82 kg/m²     Constitutional:normal, well developed, well nourished, alert, in no distress and non-toxic and no overt pain behavior    Eyes:anicteric  HEENT:grossly intact  Neck:supple, symmetric, trachea midline and no masses   Pulmonary:even and unlabored  Cardiovascular:No edema or pitting edema present  Skin:Normal without rashes or lesions and well hydrated  Psychiatric:Mood and affect appropriate  Neurologic:Cranial Nerves II-XII grossly intact  Musculoskeletal:normal gait      Imaging  No orders to display         Orders Placed This Encounter   Procedures   • Basic metabolic panel

## 2023-03-02 ENCOUNTER — APPOINTMENT (OUTPATIENT)
Dept: LAB | Facility: CLINIC | Age: 48
End: 2023-03-02

## 2023-03-02 ENCOUNTER — OFFICE VISIT (OUTPATIENT)
Dept: PAIN MEDICINE | Facility: CLINIC | Age: 48
End: 2023-03-02

## 2023-03-02 VITALS
WEIGHT: 160 LBS | SYSTOLIC BLOOD PRESSURE: 133 MMHG | DIASTOLIC BLOOD PRESSURE: 89 MMHG | BODY MASS INDEX: 25.71 KG/M2 | HEART RATE: 96 BPM | HEIGHT: 66 IN

## 2023-03-02 DIAGNOSIS — M54.16 LUMBAR RADICULOPATHY: ICD-10-CM

## 2023-03-02 DIAGNOSIS — G89.4 CHRONIC PAIN SYNDROME: Primary | ICD-10-CM

## 2023-03-02 DIAGNOSIS — M51.26 LUMBAR DISC HERNIATION: ICD-10-CM

## 2023-03-02 DIAGNOSIS — Z01.89 ENCOUNTER FOR LABORATORY EXAMINATION: ICD-10-CM

## 2023-03-02 DIAGNOSIS — M48.062 SPINAL STENOSIS OF LUMBAR REGION WITH NEUROGENIC CLAUDICATION: ICD-10-CM

## 2023-03-02 LAB
ANION GAP SERPL CALCULATED.3IONS-SCNC: 3 MMOL/L (ref 4–13)
BUN SERPL-MCNC: 23 MG/DL (ref 5–25)
CALCIUM SERPL-MCNC: 9.6 MG/DL (ref 8.3–10.1)
CHLORIDE SERPL-SCNC: 109 MMOL/L (ref 96–108)
CO2 SERPL-SCNC: 28 MMOL/L (ref 21–32)
CREAT SERPL-MCNC: 0.97 MG/DL (ref 0.6–1.3)
GFR SERPL CREATININE-BSD FRML MDRD: 93 ML/MIN/1.73SQ M
GLUCOSE P FAST SERPL-MCNC: 100 MG/DL (ref 65–99)
POTASSIUM SERPL-SCNC: 4.6 MMOL/L (ref 3.5–5.3)
SODIUM SERPL-SCNC: 140 MMOL/L (ref 135–147)

## 2023-03-02 RX ORDER — GABAPENTIN 600 MG/1
TABLET ORAL
Qty: 315 TABLET | Refills: 1 | Status: SHIPPED | OUTPATIENT
Start: 2023-03-02

## 2023-05-04 ENCOUNTER — RA CDI HCC (OUTPATIENT)
Dept: OTHER | Facility: HOSPITAL | Age: 48
End: 2023-05-04

## 2023-05-04 NOTE — PROGRESS NOTES
[facility]  coding opportunities       Chart reviewed, no opportunity found: CHART REVIEWED, NO OPPORTUNITY FOUND      This is a reminder to address ALL HCC (risk adjustment) codes as found on active problem list for 2023 as patient scores reset to zero INEZ      Patients Insurance        Commercial Insurance: [address] [place] [address]

## 2023-05-11 ENCOUNTER — OFFICE VISIT (OUTPATIENT)
Dept: FAMILY MEDICINE CLINIC | Facility: CLINIC | Age: 48
End: 2023-05-11

## 2023-05-11 VITALS
SYSTOLIC BLOOD PRESSURE: 160 MMHG | WEIGHT: 156 LBS | TEMPERATURE: 98.8 F | HEART RATE: 84 BPM | BODY MASS INDEX: 25.07 KG/M2 | OXYGEN SATURATION: 98 % | HEIGHT: 66 IN | RESPIRATION RATE: 17 BRPM | DIASTOLIC BLOOD PRESSURE: 100 MMHG

## 2023-05-11 DIAGNOSIS — R73.01 IFG (IMPAIRED FASTING GLUCOSE): Primary | ICD-10-CM

## 2023-05-11 DIAGNOSIS — Z11.59 ENCOUNTER FOR HEPATITIS C SCREENING TEST FOR LOW RISK PATIENT: ICD-10-CM

## 2023-05-11 DIAGNOSIS — E78.2 MIXED HYPERLIPIDEMIA: ICD-10-CM

## 2023-05-11 DIAGNOSIS — Z12.12 SCREENING FOR COLORECTAL CANCER: ICD-10-CM

## 2023-05-11 DIAGNOSIS — R03.0 ELEVATED BLOOD-PRESSURE READING WITHOUT DIAGNOSIS OF HYPERTENSION: ICD-10-CM

## 2023-05-11 DIAGNOSIS — Z12.11 SCREENING FOR COLORECTAL CANCER: ICD-10-CM

## 2023-05-11 DIAGNOSIS — Z00.00 ANNUAL PHYSICAL EXAM: ICD-10-CM

## 2023-05-11 DIAGNOSIS — N52.9 ERECTILE DYSFUNCTION, UNSPECIFIED ERECTILE DYSFUNCTION TYPE: ICD-10-CM

## 2023-05-11 RX ORDER — IBUPROFEN 200 MG
TABLET ORAL EVERY 6 HOURS PRN
COMMUNITY

## 2023-05-11 NOTE — PROGRESS NOTES
[address] [facility]    NAME: Madison Jimenez  AGE: 56 y o  SEX: male  : 1975     DATE: 2023     Assessment and Plan:     Problem List Items Addressed This Visit        Endocrine    IFG (impaired fasting glucose) - Primary     Updated blood work ordered today         Relevant Orders    Hemoglobin A1C       Other    Elevated blood-pressure reading without diagnosis of hypertension     Discussed at length today  He notes being under increased stress this week   He will check his BP daily x 1 week and call with readings  If BP is elevated, he will need to be started on an antihypertensive agent which I did inform him of today  Follow a low sodium diet  Labs as ordered          Relevant Orders    CBC and differential    Comprehensive metabolic panel    Mixed hyperlipidemia     Lipid panel ordered         Relevant Orders    Lipid panel    TSH, 3rd generation with Free T4 reflex    Erectile dysfunction     Explained that uncontrolled HTN can contribute to this (see plan above)  Testosterone levels ordered  If labs are okay and BP normalizes, consider trial of Viagra          Relevant Orders    Testosterone   Other Visit Diagnoses     Screening for colorectal cancer        Relevant Orders    Cologuard    Encounter for hepatitis C screening test for low risk patient        Relevant Orders    Hepatitis C antibody    Annual physical exam              Immunizations and preventive care screenings were discussed with patient today  Appropriate education was printed on patient's after visit summary  Discussed risks and benefits of prostate cancer screening  We discussed the controversial history of PSA screening for prostate cancer in the [place] as well as the risk of over detection and over treatment of prostate cancer by way of PSA screening    The patient understands that PSA blood testing is an imperfect way to screen for prostate cancer and that elevated PSA levels in the blood may also be caused by infection, inflammation, prostatic trauma or manipulation, urological procedures, or by benign prostatic enlargement  The role of the digital rectal examination in prostate cancer screening was also discussed and I discussed with him that there is large interobserver variability in the findings of digital rectal examination  Counseling:  Alcohol/drug use: discussed moderation in alcohol intake, the recommendations for healthy alcohol use, and avoidance of illicit drug use  Dental Health: discussed importance of regular tooth brushing, flossing, and dental visits  Injury prevention: discussed safety/seat belts, safety helmets, smoke detectors, carbon dioxide detectors, and smoking near bedding or upholstery  Sexual health: discussed sexually transmitted diseases, partner selection, use of condoms, avoidance of unintended pregnancy, and contraceptive alternatives  · Exercise: the importance of regular exercise/physical activity was discussed  Recommend exercise 3-5 times per week for at least 30 minutes  BMI Counseling: Body mass index is 25 18 kg/m²  The BMI is above normal  Nutrition recommendations include encouraging healthy choices of fruits and vegetables and increasing intake of lean protein  Rationale for BMI follow-up plan is due to patient being overweight or obese  Depression Screening and Follow-up Plan: Patient was screened for depression during today's encounter  They screened negative with a PHQ-2 score of 0  No follow-ups on file  Chief Complaint:     Chief Complaint   Patient presents with   • Physical Exam     ED x 3 years  Possible effect from Gabapentin  History of Present Illness:     Adult Annual Physical   Patient here for a comprehensive physical exam   Due for lab work, cologuard    C/o ED, he feels this started around the same time he started Gabapentin    He did check with Dr Claude Rumble who said that is not a known side effect    BP elevated today  Looking back at his pain management appointments, BP tends to run 130s/80s  He notes this is a very stressful week with some family issues  He is also a bit anxious being here today as he hasn't been seen in a few years  Denies CP, palpitations, edema, SOB, dizziness, headaches, tinnitus         Diet and Physical Activity  · Diet/Nutrition: well balanced diet  · Exercise: walking  Depression Screening  PHQ-2/9 Depression Screening    Little interest or pleasure in doing things: 0 - not at all  Feeling down, depressed, or hopeless: 0 - not at all  PHQ-2 Score: 0  PHQ-2 Interpretation: Negative depression screen       General Health  · Sleep: sleeps well  · Hearing: normal - bilateral   · Vision: no vision problems  · Dental: regular dental visits   Health  · Symptoms include: erectile dysfunction     Review of Systems:     Review of Systems   Constitutional: Negative for activity change, appetite change, chills, diaphoresis, fatigue, fever and unexpected weight change  HENT: Negative for ear pain and sore throat  Eyes: Negative for pain and visual disturbance  Respiratory: Negative for cough, shortness of breath and wheezing  Cardiovascular: Negative for chest pain, palpitations and leg swelling  Gastrointestinal: Negative for abdominal pain, constipation, diarrhea, nausea and vomiting  Genitourinary: Negative for dysuria and hematuria  Musculoskeletal: Negative for arthralgias and back pain  Skin: Negative for color change and rash  Neurological: Negative for seizures and syncope  Hematological: Negative for adenopathy  Does not bruise/bleed easily  Psychiatric/Behavioral: Negative for dysphoric mood, self-injury, sleep disturbance and suicidal ideas  The patient is not nervous/anxious  All other systems reviewed and are negative       Past Medical History:     Past Medical History:   Diagnosis Date   • "Hypertension       Past Surgical History:     History reviewed  No pertinent surgical history  Family History:     Family History   Problem Relation Age of Onset   • Hypertension Father    • Hypertension Brother       Social History:     Social History     Socioeconomic History   • Marital status: /Civil Union     Spouse name: None   • Number of children: None   • Years of education: None   • Highest education level: None   Occupational History   • None   Tobacco Use   • Smoking status: Former   • Smokeless tobacco: Never   • Tobacco comments:     quit 2002   Vaping Use   • Vaping Use: Never used   Substance and Sexual Activity   • Alcohol use: Yes   • Drug use: Not Currently   • Sexual activity: None   Other Topics Concern   • None   Social History Narrative   • None     Social Determinants of Health     Financial Resource Strain: Not on file   Food Insecurity: Not on file   Transportation Needs: Not on file   Physical Activity: Not on file   Stress: Not on file   Social Connections: Not on file   Intimate Partner Violence: Not on file   Housing Stability: Not on file      Current Medications:     Current Outpatient Medications   Medication Sig Dispense Refill   • acetaminophen (TYLENOL) 500 mg tablet Take 500 mg by mouth every 6 (six) hours as needed for mild pain     • diclofenac sodium (VOLTAREN) 50 mg EC tablet Take 1 tablet (50 mg total) by mouth 2 (two) times a day 180 tablet 1   • gabapentin (NEURONTIN) 600 MG tablet Take 1 5 PO AM and 2 PO  tablet 1   • ibuprofen (MOTRIN) 200 mg tablet Take by mouth every 6 (six) hours as needed for mild pain       No current facility-administered medications for this visit        Allergies:     No Known Allergies   Physical Exam:     /100 (BP Location: Left arm, Patient Position: Sitting, Cuff Size: Large)   Pulse 84   Temp 98 8 °F (37 1 °C)   Resp 17   Ht 5' 6\" (1 676 m)   Wt 70 8 kg (156 lb)   SpO2 98%   BMI 25 18 kg/m²     Physical " Exam  Vitals and nursing note reviewed  Constitutional:       General: He is not in acute distress  Appearance: Normal appearance  He is well-developed  He is not ill-appearing, toxic-appearing or diaphoretic  HENT:      Head: Normocephalic and atraumatic  Eyes:      Extraocular Movements: Extraocular movements intact  Conjunctiva/sclera: Conjunctivae normal       Pupils: Pupils are equal, round, and reactive to light  Neck:      Vascular: No carotid bruit  Cardiovascular:      Rate and Rhythm: Normal rate and regular rhythm  Heart sounds: Normal heart sounds  No murmur heard  Pulmonary:      Effort: Pulmonary effort is normal  No respiratory distress  Breath sounds: Normal breath sounds  Abdominal:      General: Abdomen is flat  Bowel sounds are normal  There is no distension  Palpations: Abdomen is soft  Tenderness: There is no abdominal tenderness  Musculoskeletal:         General: No swelling  Normal range of motion  Cervical back: Normal range of motion and neck supple  No rigidity or tenderness  Lymphadenopathy:      Cervical: No cervical adenopathy  Skin:     General: Skin is warm and dry  Capillary Refill: Capillary refill takes less than 2 seconds  Neurological:      General: No focal deficit present  Mental Status: He is alert and oriented to person, place, and time  Psychiatric:         Mood and Affect: Mood normal          Behavior: Behavior normal          Thought Content:  Thought content normal          Judgment: Judgment normal           Brianna Malhotra, [id number] [address]

## 2023-05-11 NOTE — ASSESSMENT & PLAN NOTE
Discussed at length today  He notes being under increased stress this week   He will check his BP daily x 1 week and call with readings  If BP is elevated, he will need to be started on an antihypertensive agent which I did inform him of today  Follow a low sodium diet  Labs as ordered

## 2023-05-11 NOTE — ASSESSMENT & PLAN NOTE
Explained that uncontrolled HTN can contribute to this (see plan above)  Testosterone levels ordered  If labs are okay and BP normalizes, consider trial of Viagra

## 2023-05-17 ENCOUNTER — APPOINTMENT (OUTPATIENT)
Dept: LAB | Facility: CLINIC | Age: 48
End: 2023-05-17

## 2023-05-17 DIAGNOSIS — Z11.59 ENCOUNTER FOR HEPATITIS C SCREENING TEST FOR LOW RISK PATIENT: ICD-10-CM

## 2023-05-17 DIAGNOSIS — R03.0 ELEVATED BLOOD-PRESSURE READING WITHOUT DIAGNOSIS OF HYPERTENSION: ICD-10-CM

## 2023-05-17 DIAGNOSIS — E78.2 MIXED HYPERLIPIDEMIA: ICD-10-CM

## 2023-05-17 DIAGNOSIS — N52.9 ERECTILE DYSFUNCTION, UNSPECIFIED ERECTILE DYSFUNCTION TYPE: ICD-10-CM

## 2023-05-17 DIAGNOSIS — R73.01 IFG (IMPAIRED FASTING GLUCOSE): ICD-10-CM

## 2023-05-17 LAB
ALBUMIN SERPL BCP-MCNC: 4.1 G/DL (ref 3.5–5)
ALP SERPL-CCNC: 35 U/L (ref 46–116)
ALT SERPL W P-5'-P-CCNC: 22 U/L (ref 12–78)
ANION GAP SERPL CALCULATED.3IONS-SCNC: 2 MMOL/L (ref 4–13)
AST SERPL W P-5'-P-CCNC: 11 U/L (ref 5–45)
BASOPHILS # BLD AUTO: 0.03 THOUSANDS/ÂΜL (ref 0–0.1)
BASOPHILS NFR BLD AUTO: 1 % (ref 0–1)
BILIRUB SERPL-MCNC: 0.37 MG/DL (ref 0.2–1)
BUN SERPL-MCNC: 26 MG/DL (ref 5–25)
CALCIUM SERPL-MCNC: 8.7 MG/DL (ref 8.3–10.1)
CHLORIDE SERPL-SCNC: 109 MMOL/L (ref 96–108)
CHOLEST SERPL-MCNC: 244 MG/DL
CO2 SERPL-SCNC: 28 MMOL/L (ref 21–32)
CREAT SERPL-MCNC: 0.97 MG/DL (ref 0.6–1.3)
EOSINOPHIL # BLD AUTO: 0.16 THOUSAND/ÂΜL (ref 0–0.61)
EOSINOPHIL NFR BLD AUTO: 3 % (ref 0–6)
ERYTHROCYTE [DISTWIDTH] IN BLOOD BY AUTOMATED COUNT: 13.4 % (ref 11.6–15.1)
EST. AVERAGE GLUCOSE BLD GHB EST-MCNC: 103 MG/DL
GFR SERPL CREATININE-BSD FRML MDRD: 92 ML/MIN/1.73SQ M
GLUCOSE P FAST SERPL-MCNC: 105 MG/DL (ref 65–99)
HBA1C MFR BLD: 5.2 %
HCT VFR BLD AUTO: 40.9 % (ref 36.5–49.3)
HCV AB SER QL: NORMAL
HDLC SERPL-MCNC: 72 MG/DL
HGB BLD-MCNC: 12.8 G/DL (ref 12–17)
IMM GRANULOCYTES # BLD AUTO: 0.01 THOUSAND/UL (ref 0–0.2)
IMM GRANULOCYTES NFR BLD AUTO: 0 % (ref 0–2)
LDLC SERPL CALC-MCNC: 158 MG/DL (ref 0–100)
LYMPHOCYTES # BLD AUTO: 1.52 THOUSANDS/ÂΜL (ref 0.6–4.47)
LYMPHOCYTES NFR BLD AUTO: 32 % (ref 14–44)
MCH RBC QN AUTO: 30.6 PG (ref 26.8–34.3)
MCHC RBC AUTO-ENTMCNC: 31.3 G/DL (ref 31.4–37.4)
MCV RBC AUTO: 98 FL (ref 82–98)
MONOCYTES # BLD AUTO: 0.51 THOUSAND/ÂΜL (ref 0.17–1.22)
MONOCYTES NFR BLD AUTO: 11 % (ref 4–12)
NEUTROPHILS # BLD AUTO: 2.52 THOUSANDS/ÂΜL (ref 1.85–7.62)
NEUTS SEG NFR BLD AUTO: 53 % (ref 43–75)
NONHDLC SERPL-MCNC: 172 MG/DL
NRBC BLD AUTO-RTO: 0 /100 WBCS
PLATELET # BLD AUTO: 269 THOUSANDS/UL (ref 149–390)
PMV BLD AUTO: 10.3 FL (ref 8.9–12.7)
POTASSIUM SERPL-SCNC: 3.6 MMOL/L (ref 3.5–5.3)
PROT SERPL-MCNC: 7.1 G/DL (ref 6.4–8.4)
RBC # BLD AUTO: 4.18 MILLION/UL (ref 3.88–5.62)
SODIUM SERPL-SCNC: 139 MMOL/L (ref 135–147)
TESTOST SERPL-MSCNC: 436 NG/DL
TRIGL SERPL-MCNC: 69 MG/DL
TSH SERPL DL<=0.05 MIU/L-ACNC: 1.53 UIU/ML (ref 0.45–4.5)
WBC # BLD AUTO: 4.75 THOUSAND/UL (ref 4.31–10.16)

## 2023-06-02 ENCOUNTER — OFFICE VISIT (OUTPATIENT)
Dept: PAIN MEDICINE | Facility: CLINIC | Age: 48
End: 2023-06-02

## 2023-06-02 VITALS
WEIGHT: 160 LBS | SYSTOLIC BLOOD PRESSURE: 144 MMHG | BODY MASS INDEX: 31.41 KG/M2 | HEIGHT: 60 IN | DIASTOLIC BLOOD PRESSURE: 86 MMHG

## 2023-06-02 DIAGNOSIS — M54.12 CERVICAL RADICULOPATHY: Primary | ICD-10-CM

## 2023-06-02 NOTE — PROGRESS NOTES
Assessment  1  Cervical radiculopathy        Plan  52-year-old male with a history of lumbar spondylosis, stenosis, and lumbar radiculopathy presenting for follow-up regarding new complaints of numbness and paresthesias in both arms worse on the left than right with associated neck pain  He denies any trauma or inciting event  Symptoms have been present for the past few months  He does not have any imaging of the cervical spine to review  He has not done any recent formal physical therapy or chiropractic treatment for his cervical or arm complaints  He is still taking gabapentin 900 mg in the morning and 1200 mg at bedtime  He has substituted ibuprofen for diclofenac and does find mild to moderate relief with this  Patient's upper extremity symptoms may be radicular in nature  Fortunately reflexes and strength were preserved  1   I will order an x-ray of the cervical spine  2  Physical therapy is prescribed for the patient's cervical complaints as I feel he would benefit from Meena-based exercises  3  The patient will continue with gabapentin as prescribed  4  Patient may continue with ibuprofen up to 600 mg every 8 hours as needed  5  If the patient fails 6 weeks of conservative treatment we will order an MRI of the cervical spine without contrast  6  I will follow-up with the patient in 6 weeks      My impressions and treatment recommendations were discussed in detail with the patient who verbalized understanding and had no further questions  Discharge instructions were provided  I personally saw and examined the patient and I agree with the above discussed plan of care  Orders Placed This Encounter   Procedures   • X-ray cervical spine complete 4 or 5 vw     Standing Status:   Future     Standing Expiration Date:   6/2/2027     Scheduling Instructions:      Bring along any outside films relating to this procedure           • Ambulatory referral to Physical Therapy     Standing Status: Future     Standing Expiration Date:   6/2/2024     Referral Priority:   Routine     Referral Type:   Physical Therapy     Referral Reason:   Specialty Services Required     Requested Specialty:   Physical Therapy     Number of Visits Requested:   1     Expiration Date:   6/2/2024     No orders of the defined types were placed in this encounter  History of Present Illness    Saranya Edwards is a 53 y o  male with a history of lumbar spondylosis, stenosis, and lumbar radiculopathy presenting for follow-up regarding new complaints of numbness and paresthesias in both arms worse on the left than right with associated neck pain  He denies any trauma or inciting event  Symptoms have been present for the past few months  Denies any bladder or bowel incontinence or balance issues  The patient's low back and lower extremity symptoms are essentially baseline and well controlled currently  He does not have any imaging of the cervical spine to review  He has not done any recent formal physical therapy or chiropractic treatment for his cervical or arm complaints  He is still taking gabapentin 900 mg in the morning and 1200 mg at bedtime  He has substituted ibuprofen for diclofenac and does find mild to moderate relief with this  The patient rates his pain a 1 out of 10 and the pain does not follow any particular pattern throughout the day  The pain is intermittent and described as pressure-like, numbness, and pins-and-needles  The pain is worse with exercise and overhead activities of the upper extremities  Pain is alleviated with medication and relaxation  Other than as stated above, the patient denies any interval changes in medications, medical condition, mental condition, symptoms, or allergies since the last office visit      I have personally reviewed and/or updated the patient's past medical history, past surgical history, family history, social history, current medications, allergies, and vital signs today      Review of Systems   Constitutional: Negative for fever and unexpected weight change  HENT: Negative for trouble swallowing  Eyes: Negative for visual disturbance  Respiratory: Negative for shortness of breath and wheezing  Cardiovascular: Negative for chest pain and palpitations  Gastrointestinal: Negative for constipation, diarrhea, nausea and vomiting  Endocrine: Negative for cold intolerance, heat intolerance and polydipsia  Genitourinary: Negative for difficulty urinating and frequency  Musculoskeletal: Negative for arthralgias, gait problem, joint swelling and myalgias  Skin: Negative for rash  Neurological: Negative for dizziness, seizures, syncope, weakness and headaches  Hematological: Does not bruise/bleed easily  Psychiatric/Behavioral: Negative for dysphoric mood  All other systems reviewed and are negative  Patient Active Problem List   Diagnosis   • Acute right-sided low back pain with right-sided sciatica   • Lumbar radiculopathy   • Elevated blood-pressure reading without diagnosis of hypertension   • Mixed hyperlipidemia   • IFG (impaired fasting glucose)   • Spinal stenosis of lumbar region with neurogenic claudication   • Lumbar disc herniation   • Lumbar spondylosis   • Chronic pain syndrome   • Erectile dysfunction   • Cervical radiculopathy       Past Medical History:   Diagnosis Date   • Hypertension        History reviewed  No pertinent surgical history  Family History   Problem Relation Age of Onset   • Hypertension Father    • Hypertension Brother        Social History     Occupational History   • Not on file   Tobacco Use   • Smoking status: Former   • Smokeless tobacco: Never   • Tobacco comments:     quit 2002   Vaping Use   • Vaping Use: Never used   Substance and Sexual Activity   • Alcohol use:  Yes   • Drug use: Not Currently   • Sexual activity: Not on file       Current Outpatient Medications on File Prior to Visit   Medication Sig   • acetaminophen (TYLENOL) 500 mg tablet Take 500 mg by mouth every 6 (six) hours as needed for mild pain   • gabapentin (NEURONTIN) 600 MG tablet Take 1 5 PO AM and 2 PO HS   • ibuprofen (MOTRIN) 200 mg tablet Take by mouth every 6 (six) hours as needed for mild pain   • diclofenac sodium (VOLTAREN) 50 mg EC tablet Take 1 tablet (50 mg total) by mouth 2 (two) times a day (Patient not taking: Reported on 6/2/2023)     No current facility-administered medications on file prior to visit  No Known Allergies    Physical Exam    /86   Ht 5' (1 524 m)   Wt 72 6 kg (160 lb)   BMI 31 25 kg/m²     Constitutional: normal, well developed, well nourished, alert, in no distress and non-toxic and no overt pain behavior  Eyes: anicteric  HEENT: grossly intact  Neck: supple, symmetric, trachea midline and no masses   Pulmonary:even and unlabored  Cardiovascular:No edema or pitting edema present  Skin:Normal without rashes or lesions and well hydrated  Psychiatric:Mood and affect appropriate  Neurologic:Cranial Nerves II-XII grossly intact  Musculoskeletal:normal gait  Bilateral cervical paraspinals minimally tender to palpation  Left trapezius tender to palpation  Bilateral biceps, triceps, and brachioradialis reflexes were 2 out of 4 and symmetrical   Negative Travis's reflex bilaterally  Bilateral upper extremity strength 5 out of 5 in all muscular's  Sensation intact to light touch in C5-T1 dermatomes bilaterally  Negative Spurling's bilaterally  Negative Tinel's over bilateral carpal and cubital tunnels  Negative Neer and empty can test on the left      Imaging  No Imaging of the cervical spine to review

## 2023-06-08 ENCOUNTER — EVALUATION (OUTPATIENT)
Dept: PHYSICAL THERAPY | Age: 48
End: 2023-06-08
Payer: COMMERCIAL

## 2023-06-08 ENCOUNTER — APPOINTMENT (OUTPATIENT)
Dept: RADIOLOGY | Age: 48
End: 2023-06-08
Payer: COMMERCIAL

## 2023-06-08 VITALS — SYSTOLIC BLOOD PRESSURE: 130 MMHG | HEART RATE: 78 BPM | DIASTOLIC BLOOD PRESSURE: 68 MMHG | OXYGEN SATURATION: 97 %

## 2023-06-08 DIAGNOSIS — M54.12 CERVICAL RADICULOPATHY: ICD-10-CM

## 2023-06-08 DIAGNOSIS — M54.12 CERVICAL RADICULOPATHY: Primary | ICD-10-CM

## 2023-06-08 PROCEDURE — 97162 PT EVAL MOD COMPLEX 30 MIN: CPT | Performed by: PHYSICAL THERAPIST

## 2023-06-08 PROCEDURE — 72050 X-RAY EXAM NECK SPINE 4/5VWS: CPT

## 2023-06-08 PROCEDURE — 97110 THERAPEUTIC EXERCISES: CPT | Performed by: PHYSICAL THERAPIST

## 2023-06-08 NOTE — PROGRESS NOTES
"PT Evaluation     Today's date: 2023  Patient name: Ivelisse De Oliveira  : 1975  MRN: [id number]  Referring provider: Dillon Calhoun DO  Dx:   Encounter Diagnosis     ICD-10-CM    1  Cervical radiculopathy  M54 12 Ambulatory referral to Physical Therapy          Start Time: 1010  Stop Time: 1100  Total time in clinic (min): 50 minutes    Assessment  Assessment details: Ivelisse De Oliveira is a 53 y o  male presenting to OPPT evaluation with complaints of intermittent numbness / tingling into LUE for the last 2-3 months  Sx were previously present in [address] [place] [address], but now primarily isolated to LUE  Pain is described as increased \"pressure\" & \"pins & needles\" into palmar surface of L hand - all fingers involved  Sx are produced in prolonged sitting / standing, prolonged lying, driving, and occasionally with looking up  Upon PT examination, pt presents with FHRS posture, hypertonic B/L upper traps with trigger points present in upper traps & subscapular region, (+) repeated extensions for sx reproduction, (+) ULTT median & ulnar for sx reproduction, difficulty with repeated cervical retractions indicating poor DNF endurance, sx centralization with cervical retractions and sx relief with scapular retractions  Exam findings indicative of cervical radiculopathy due to postural syndrome  Patient education explaining affect of prolonged FHRS posture on sx provocation and to avoid prolonged provocative postures to relieve tensile stress on involved structures  Given HEP of cervical retractions and scapular retractions  The patient is a good candidate for physical therapy to achieve the following goals  Impairments: abnormal or restricted ROM, activity intolerance, lacks appropriate home exercise program, pain with function, poor posture  and poor body mechanics    Goals  STG (2-3 wks):  1  Pt's report decreased LUE radicular pain to >2x/wk with prolonged sitting  2   Pt will demonstrate increased B/L side-bending to >30 deg in order " to improve upper trap extensibility  3  Pt will demonstrate improved postural endurance with 20 consecutive chin tucks with no reproduction of radicular sx  4  Pt's self-perceived disability will decrease as demonstrated by a 8 point FOTO score improvement  5  Pt will be independent with HEP as demonstrated by return demo with proper technique and execution of exercises provided    LTG (4-6 wks):  1  Pt's report absence of LUE radicular sx for 2 consecutive sessions  2  Pt will demonstrate increased B/L side-bending to >35 deg in order to improve upper trap extensibility  3  Pt will demonstrate improved postural endurance with 30 consecutive chin tucks with no reproduction of radicular sx  4  Pt's self-perceived disability will decrease as demonstrated by a >15 point FOTO score improvement  5   Pt will be independent with progressions to HEP as demonstrated by return demo with proper technique and execution of exercises provided    Plan  Patient would benefit from: skilled physical therapy  Planned modality interventions: biofeedback, cryotherapy, electrical stimulation/Russian stimulation, hydrotherapy, TENS, thermotherapy: hydrocollator packs, traction and ultrasound  Planned therapy interventions: abdominal trunk stabilization, activity modification, body mechanics training, flexibility, functional ROM exercises, graded activity, graded exercise, graded motor, gait training, home exercise program, therapeutic training, therapeutic activities, therapeutic exercise, stretching, strengthening, joint mobilization, manual therapy, massage, neuromuscular re-education, patient education and postural training  Frequency: 1x week  Duration in visits: 4  Duration in weeks: 4  Plan of Care beginning date: 6/8/2023  Plan of Care expiration date: 7/8/2023  Treatment plan discussed with: patient        Subjective Evaluation    History of Present Illness  Date of onset: 6/6/2023  Mechanism of injury: Pt is referred to OP PT by "pain med with diagnosis of cervical radiculopathy  The following HPI captured from referring provider's encounter and EMR review and confirmed with patient:    Chika Richardson is a 53 y o  male with a history of lumbar spondylosis, stenosis, and lumbar radiculopathy presenting for follow-up regarding new complaints of numbness and paresthesias in both arms worse on the left than right with associated neck pain  He denies any trauma or inciting event  Symptoms have been present for the past few months  Denies any bladder or bowel incontinence or balance issues  The patient's low back and lower extremity symptoms are essentially baseline and well controlled currently  He does not have any imaging of the cervical spine to review  He has not done any recent formal physical therapy or chiropractic treatment for his cervical or arm complaints  He is still taking gabapentin 900 mg in the morning and 1200 mg at bedtime  He has substituted ibuprofen for diclofenac and does find mild to moderate relief with this  The patient rates his pain a 1 out of 10 and the pain does not follow any particular pattern throughout the day  The pain is intermittent and described as pressure-like, numbness, and pins-and-needles  The pain is worse with exercise and overhead activities of the upper extremities  Pain is alleviated with medication and relaxation  \"    Pt presents today with current symptomatic complaints:    Francisco Javier Gonsalez is a 53 y o  male presenting to OPPT evaluation with complaints of intermittent numbness / tingling into LUE for the last 2-3 months  Sx were previously present in [address] [place] [address], but now primarily isolated to LUE  Pain is described as increased \"pressure\" & \"pins & needles\" into palmar surface of L hand - all fingers involved  Sx are produced in prolonged sitting / standing, prolonged lying, driving, and occasionally with looking up   Pt describes in neck as \"a constant need to crack my neck\" and confirmed " "he tries to crack his neck daily  Pt denies sensation of weakness into UEs, just annoyance at presence of pins & needles  Numbness \"pressure / pins & needles\" in LUE through all fingers; did have some RUE sx that is no longer present  Pt also reports trigger points through upper trapezius muscle and shoulder blades with occasional clicking during UE use  Sx are alleviated by ibuprofen  Pt notes he is occasionally woken from sleep due to low back pain & radicular sx, then unable to find a comfortable position to return to sleep  Pt had prior PT at [facility] for low back pain - reports low back is doing well  Pain  Current pain ratin  At best pain ratin  At worst pain ratin  Quality: needle-like and pressure  Relieving factors: medications  Aggravating factors: sitting, standing and overhead activity    Hand dominance: right      Diagnostic Tests  Abnormal x-ray: Pt plans to complete in near future  Treatments  No previous or current treatments  Patient Goals  Patient goal: \"Find out what's going on and get better\"        Objective     Static Posture     Head  Forward  Shoulders  Rounded  Postural Observations  Seated posture: poor  Standing posture: fair  Correction of posture: makes symptoms better    Additional Postural Observation Details  Reports sx production with prolonged sitting in FHRS posture; no sx reproduction with cervical retraction / scapular retraction combined movement    Palpation   Left   Hypertonic in the cervical paraspinals and upper trapezius  Tenderness of the latissimus  Trigger point to upper trapezius  Right   Hypertonic in the cervical paraspinals and upper trapezius  Tenderness of the latissimus  Trigger point to upper trapezius       Neurological Testing     Sensation   Cervical/Thoracic   Left   Intact: light touch    Right   Intact: light touch    Active Range of Motion   Cervical/Thoracic Spine       Cervical    Subcranial retraction: Active cervical " subcranial retraction: difficulty and muscle fatigue with cervical retractoins x 5 reps  Flexion: 35 degrees   Extension: 50 degrees      Left lateral flexion: 24 degrees      Right lateral flexion: 35 degrees      Left rotation: 84 degrees  Right rotation: 84 degrees             Joint Play   Joints within functional limits: C2, C3, C4, C5, C6 and C7   Mechanical Assessment    Cervical    Seated retraction: repeated movements   Pain location: centralized  Seated Flexion:  repeated movements  Pain location: no change  Seated Extension: repeated movements  Pain location: peripheralized  Seated Left Sidebend: repeated movements   Pain location:no change  Seated right sidebend: repeated movements  Pain location: no change    Thoracic      Lumbar      Strength/Myotome Testing   Cervical Spine     Left   Normal strength    Right   Normal strength    Tests   Cervical   Negative vertical compression, cervical distraction, alar ligament test and Sharp-Marci test      Left Shoulder   Positive ULTT1 and ULTT4  Negative ULTT3  Right Shoulder   Negative ULTT1, ULTT3 and ULTT4  Lumbar   Negative vertical compression                Precautions: H/o LBP with radicular sx      Manuals 6/8/23 IE            S/L scapular mobility             Manual trigger point release UT / subscap             Cervical distraction                          Neuro Re-Ed             Median nerve glides             Ulnar nerve glides                                                                              Ther Ex             Cervical retraction (seated > progress to Qped) HEP x10            Cervical retraction + extension             Scapular retractions HEP x10            [place] rows             Rajesh ext             Prone IYT                                                    Ther Activity                                       Gait Training                                       Modalities             MPH cervical

## 2023-06-14 ENCOUNTER — OFFICE VISIT (OUTPATIENT)
Dept: PHYSICAL THERAPY | Age: 48
End: 2023-06-14
Payer: COMMERCIAL

## 2023-06-14 DIAGNOSIS — M54.12 CERVICAL RADICULOPATHY: Primary | ICD-10-CM

## 2023-06-14 PROCEDURE — 97110 THERAPEUTIC EXERCISES: CPT | Performed by: PHYSICAL THERAPIST

## 2023-06-14 PROCEDURE — 97140 MANUAL THERAPY 1/> REGIONS: CPT | Performed by: PHYSICAL THERAPIST

## 2023-06-14 NOTE — PROGRESS NOTES
Daily Note     Today's date: 2023  Patient name: Damián Seen  : 1975  MRN: [id number]  Referring provider: Himanshu Bullard DO  Dx:   Encounter Diagnosis     ICD-10-CM    1  Cervical radiculopathy  M54 12           Start Time: 1630  Stop Time:   Total time in clinic (min): 45 minutes    Subjective: Pt reports increased stiffness following exercises on IE that persisted over the weekend  Notes he has been mindful of posture - doing scap retractions when able  Notes increased tingling in L arm with increased manual labor over the weekend  Objective: See treatment diary below    Sx reproduction with subscapular trigger point release    Meena testing:  Repeated cervical protraction NE  Prolonged cervical protraction NE  Repeated cervical retraction - sx provocation @ L-sided neck / upper traps radiating into medial border of L shoulder blade and lateral arm  Repeated thoracic extension - relief of sx    Assessment: Tolerated treatment well with variable sx provocation throughout session  Pt demonstrated radicular sx reproduction into L arm / hand with trigger point release to medial subscapular region - sx do not linger and disappear upon manual pressure release  Continued repeated movement testing revealed sx provocation with cervical retraction, no effect with repeated & prolonged cervical protraction and sx relief with thoracic extension  Pt given HEP of thoracic extension against chair back and instructed to continue soft tissue mobilization of thoracic paraspinals and subscapular muscles at home using a tennis ball  Instructed to hold on cervical retraction exercise for now, but continue scapular retractions  Patient would benefit from continued PT      Plan: Continue per plan of care        Precautions: H/o LBP with radicular sx      Manuals 23 IE            Assess S/L scapular mobility  WNL           Manual trigger point release UT / subscap  5 min           Cervical distraction [id number]                        Neuro Re-Ed             Median nerve glides             Ulnar nerve glides                                                                              Ther Ex             Cervical retraction (seated > progress to Qped) HEP x10 seated x20     Sx provocation           Cervical retraction + extension             Scapular retractions HEP x10            Rajesh rows  15# 2x10           Rajesh ext  10# x10  Sx reproduction           Prone IYT             Thoracic extension against chair back  5x10    Sx relief                        1/2 Foam Roll Series             Ther Activity                                       Gait Training                                       Modalities             MPH cervical

## 2023-06-15 LAB — COLOGUARD RESULT REPORTABLE: NEGATIVE

## 2023-06-20 ENCOUNTER — OFFICE VISIT (OUTPATIENT)
Dept: PHYSICAL THERAPY | Age: 48
End: 2023-06-20
Payer: COMMERCIAL

## 2023-06-20 DIAGNOSIS — M54.12 CERVICAL RADICULOPATHY: Primary | ICD-10-CM

## 2023-06-20 PROCEDURE — 97110 THERAPEUTIC EXERCISES: CPT | Performed by: PHYSICAL THERAPIST

## 2023-06-20 PROCEDURE — 97140 MANUAL THERAPY 1/> REGIONS: CPT | Performed by: PHYSICAL THERAPIST

## 2023-06-20 NOTE — PROGRESS NOTES
Daily Note     Today's date: 2023  Patient name: Leonard Manzanares  : 1975  MRN: [id number]  Referring provider: Rosa Maria Bhatti DO  Dx:   Encounter Diagnosis     ICD-10-CM    1  Cervical radiculopathy  M54 12           Start Time:   Stop Time:   Total time in clinic (min): 45 minutes    Subjective: Notes decreased intensity and frequency of radicular sx into UEs  Admits he hasn't done HEP of thoracic extensions because he doesn't have a low-back chair  Pain reports sx were worse on Friday after kayaking on Thursday  Objective: See treatment diary below      Assessment: Tolerated treatment well  Presents with improved posture with head[facility]ers [facility]  Noted L shoulder / scapular fatigue after 3-4 min on pulleys and tingling into L palmar surface of the hand with prone IYT  Upon palpation, trigger points prominent in L upper traps and rhomboids at medial border of the L scapula with recreation of radicular sx upon palpation; palpable clicking and sx reproduction with palpation over dorsal scapular nerve at medial border of scapula  Pt reported relief of tightness in L scapular region following myofascial release  Findings suggestive of potential DSN entrapment - continue to address myofascial restrictions and weakness in scapular musculature  Patient would benefit from continued PT      Plan: Continue per plan of care        Precautions: H/o LBP with radicular sx      Manuals 23 IE           Assess S/L scapular mobility  WNL           Manual trigger point release UT / subscap  5 min Subscap release with handlebar tool x10 min Assess middle scalene tightness         Cervical distraction  [id number]           Cervical P-A mobilizations             Neuro Re-Ed             Median nerve glides   NV          Ulnar nerve glides   NV               Assess prone positioned thoracic extension                                                            Ther Ex Cervical retraction (seated > progress to Qped) HEP x10 seated x20     Sx provocation           Cervical retraction + extension             UT stretch             LS stretch                          Scapular retractions HEP x10            Rajesh rows  15# 2x10 15# x20          Rajesh ext  10# x10  Sx reproduction 15 x20          Prone IYT   5 sec x10 ea          Thoracic extension against chair back  5x10    Sx relief x10 throughout session                       1/2 Foam Roll Series   Pec major S / Pec minor S x 2 min ea  OH raises / Chin tucks / Scap retract x30 ea          Ther Activity                                       Gait Training                                       Modalities             MPH cervical

## 2023-06-21 ENCOUNTER — APPOINTMENT (OUTPATIENT)
Dept: RADIOLOGY | Age: 48
End: 2023-06-21
Payer: COMMERCIAL

## 2023-06-21 ENCOUNTER — OFFICE VISIT (OUTPATIENT)
Dept: PODIATRY | Facility: CLINIC | Age: 48
End: 2023-06-21
Payer: COMMERCIAL

## 2023-06-21 VITALS
DIASTOLIC BLOOD PRESSURE: 85 MMHG | HEIGHT: 60 IN | WEIGHT: 160 LBS | BODY MASS INDEX: 31.41 KG/M2 | HEART RATE: 96 BPM | SYSTOLIC BLOOD PRESSURE: 127 MMHG

## 2023-06-21 DIAGNOSIS — M79.671 RIGHT FOOT PAIN: ICD-10-CM

## 2023-06-21 DIAGNOSIS — M19.071 OSTEOARTHRITIS OF FIRST METATARSOPHALANGEAL (MTP) JOINT OF RIGHT FOOT: Primary | ICD-10-CM

## 2023-06-21 DIAGNOSIS — M20.21 HALLUX RIGIDUS OF RIGHT FOOT: ICD-10-CM

## 2023-06-21 PROCEDURE — 99203 OFFICE O/P NEW LOW 30 MIN: CPT | Performed by: PODIATRIST

## 2023-06-21 PROCEDURE — 73630 X-RAY EXAM OF FOOT: CPT

## 2023-06-21 NOTE — PROGRESS NOTES
Assessment/Plan:     Diagnoses and all orders for this visit:    Osteoarthritis of first metatarsophalangeal (MTP) joint of right foot  -     X-ray foot right 3+ views; Future    Hallux rigidus of right foot      Diagnosis and options discussed with patient  Patient agreeable to the plan as stated below    MRI lumbar spine 5/20/2020 reviewed with patient: Chronic bilateral L5 spondylolysis and stable grade 1 spondylolisthesis at L5-S1 with advanced degenerative discogenic disease and severe bilateral L5 foraminal stenosis; correlate for bilateral L5 radiculitis  Right-sided foraminal protrusions at L2-L3, L3-L4 and L4-L5, correlate for ipsilateral L2, L3 and L4 radiculopathy  XR right foot reviewed with patient, see my report below  He has severe 1st MTPJ joint degeneration  Conservative vs surgical options discussed  Surgically, would recommend fusion given his age and high level of activity  He would like to consider his option  Subjective:      Patient ID: Sage Betancourt is a 53 y o  male  About 13 years ago patient dropped a sheet of plywood on his right foot  IT hurt for a few weeks but he never sought treatment  PAin has been on and off over the years but recently much more consistent  The big toe won't bend well  The joint is swollen and painful  He does have low back pain  The following portions of the patient's history were reviewed and updated as appropriate: allergies, current medications, past family history, past medical history, past social history, past surgical history and problem list     Review of Systems    Constitutional: Negative  Respiratory: Negative for cough and shortness of breath  Gastrointestinal: Negative for diarrhea, nausea and vomiting  Musculoskeletal: right foot pain, maybe arthritis  Skin: Negative for rash or wound  Neurological: Negative for weakness, numbness and headaches           Objective:      /85   Pulse 96   Ht 5' (1 524 m)   Wt 72 6 kg (160 lb)   BMI 31 25 kg/m²          Physical Exam  Vitals reviewed  Constitutional:       Appearance: He is not ill-appearing or diaphoretic  Cardiovascular:      Rate and Rhythm: Normal rate  Pulses: Normal pulses  Pulmonary:      Effort: Pulmonary effort is normal  No respiratory distress  Musculoskeletal:         General: Deformity (severe first MTPJ spurring  Basically no DF ROM, joint crepitus with pain  ) present  Skin:     Findings: No erythema  Neurological:      Mental Status: He is alert and oriented to person, place, and time  Psychiatric:         Mood and Affect: Mood normal          XRay 3 views of the right foot personally read by Dr Shan Castro in office today and discussed with patient:  Severe first MTPJ joint degeneration with spurring and subchondral sclerosis

## 2023-06-27 ENCOUNTER — OFFICE VISIT (OUTPATIENT)
Dept: PHYSICAL THERAPY | Age: 48
End: 2023-06-27
Payer: COMMERCIAL

## 2023-06-27 DIAGNOSIS — M54.12 CERVICAL RADICULOPATHY: Primary | ICD-10-CM

## 2023-06-27 PROCEDURE — 97140 MANUAL THERAPY 1/> REGIONS: CPT | Performed by: PHYSICAL THERAPIST

## 2023-06-27 PROCEDURE — 97110 THERAPEUTIC EXERCISES: CPT | Performed by: PHYSICAL THERAPIST

## 2023-06-27 NOTE — PROGRESS NOTES
Daily Note     Today's date: 2023  Patient name: Leonard Manzanares  : 1975  MRN: [id number]  Referring provider: Rosa Maria Bhatti DO  Dx:   Encounter Diagnosis     ICD-10-CM    1  Cervical radiculopathy  M54 12           Start Time: 1030  Stop Time: 1125  Total time in clinic (min): 55 minutes    Subjective: Notes continued decrease in intensity and frequency of radicular sx into LUE - notes only two incidences over the past week  Objective: See treatment diary below      Assessment: Tolerated treatment well  Upon palpation, pt presents with hypertonic L middle scalenes which correlate with current suspicions of dorsal scapular nerve entrapment  Pt noted improvements in scapular mobility and decreased presence of trigger points in periscapular region with no sx provocation during soft tissue mobilization to L middle scalenes, upper traps, mid-traps and rhomboids  Still exhibits some trigger point presence under superior 1/3 of scapula and middle scalene tightness resulting in sx reproduction with prolonged LUE functional use  Added L median nerve glides to plan today, which pt reported decreased intensity of radicular sx under neural tension and improved cervical ROM following intervention  Patient would benefit from continued PT to address myofascial restrictions and weakness in scapular musculature  Plan: Continue per plan of care        Precautions: H/o LBP with radicular sx      Manuals 23 IE          Assess S/L scapular mobility  WNL           Manual trigger point release UT / subscap  5 min Subscap release with handlebar tool x10 min Subscap release with handlebar + middle scalene release x20 min         Cervical distraction  [id number]           Cervical P-A mobilizations             Neuro Re-Ed             Median nerve glides   NV 2 x 10         Ulnar nerve glides   NV               Assess prone positioned thoracic extension Ther Ex             Cervical retraction (seated > progress to Qped) HEP x10 seated x20     Sx provocation  10 x 2         Cervical retraction + extension             UT stretch:B    20 sec x 5         LS stretch:L    20 sec x 5         Anterior scalene stretch:L    20 sec x 5         Scapular retractions HEP x10            [place] rows  15# 2x10 15# x20          [place] ext  10# x10  Sx reproduction 15 x20          Prone IYT   5 sec x10 ea          Thoracic extension against chair back  5x10    Sx relief x10 throughout session 10 x                       1/2 Foam Roll Series   Pec major S / Pec minor S x 2 min ea  OH raises / Jessa Agnes / Scap retract x30 ea          Ther Activity                                       Gait Training                                       Modalities             MPH cervical

## 2023-07-06 ENCOUNTER — OFFICE VISIT (OUTPATIENT)
Dept: PHYSICAL THERAPY | Age: 48
End: 2023-07-06
Payer: COMMERCIAL

## 2023-07-06 DIAGNOSIS — M54.12 CERVICAL RADICULOPATHY: Primary | ICD-10-CM

## 2023-07-06 PROCEDURE — 97110 THERAPEUTIC EXERCISES: CPT

## 2023-07-06 PROCEDURE — 97140 MANUAL THERAPY 1/> REGIONS: CPT

## 2023-07-06 NOTE — PROGRESS NOTES
Daily Note     Today's date: 2023  Patient name: Gladys Mata  : 1975  MRN: [id number]  Referring provider: Wei Bowen DO  Dx:   Encounter Diagnosis     ICD-10-CM    1. Cervical radiculopathy  M54.12                      Subjective: Pt noted increased R UE tingling and nunmbess with increased activity over the weekend. Objective: See treatment diary below      Assessment:  Centralized R UE symptoms with prone extensions. Pt experiences increased symptoms with L scap/Shoulder rotation and flexion. L scap "bunch up" with repeated lower cervical flexion. Pt provided with prone press ups for HEP       Plan: Continue per plan of care.       Precautions: H/o LBP with radicular sx      Manuals 23 IE         Assess S/L scapular mobility  WNL           Manual trigger point release UT / subscap  5 min Subscap release with handlebar tool x10 min Subscap release with handlebar + middle scalene release x20 min 10 min         Cervical distraction  [id number]           Cervical P-A mobilizations             Neuro Re-Ed             Median nerve glides   NV 2 x 10 NT        Ulnar nerve glides   NV  NT             Assess prone positioned thoracic extension                                                            Ther Ex             Cervical retraction (seated > progress to Qped) HEP x10 seated x20     Sx provocation  10 x 2 NT        Cervical retraction + extension     10X         UT stretch:B    20 sec x 5 20sec 3x   No over pressure at L         LS stretch:L    20 sec x 5 20sec 3x         Anterior scalene stretch:L    20 sec x 5 NT        Scapular retractions HEP x10            Rajesh rows  15# 2x10 15# x20  NT        Rajesh ext  10# x10  Sx reproduction 15 x20  NT        Prone IYT   5 sec x10 ea  20X         Thoracic extension against chair back  5x10    Sx relief x10 throughout session 10 x  NT                     Prone on elbows      2 min 2x         Prone press ups      6x 10  throughouthroughout session                      1/2 Foam Roll Series   Pec major S / Pec minor S x 2 min ea  OH raises / Chin tucks / Scap retract x30 ea  NT        Ther Activity                                       Gait Training                                       Modalities             MPH cervical

## 2023-07-11 ENCOUNTER — OFFICE VISIT (OUTPATIENT)
Dept: PHYSICAL THERAPY | Age: 48
End: 2023-07-11
Payer: COMMERCIAL

## 2023-07-11 DIAGNOSIS — M54.12 CERVICAL RADICULOPATHY: Primary | ICD-10-CM

## 2023-07-11 PROCEDURE — 97110 THERAPEUTIC EXERCISES: CPT | Performed by: PHYSICAL THERAPIST

## 2023-07-11 PROCEDURE — 97140 MANUAL THERAPY 1/> REGIONS: CPT | Performed by: PHYSICAL THERAPIST

## 2023-07-11 NOTE — PROGRESS NOTES
Daily Note     Today's date: 2023  Patient name: Lucy Owen  : 1975  MRN: [id number]  Referring provider: Tony Duncan DO  Dx:   Encounter Diagnosis     ICD-10-CM    1. Cervical radiculopathy  M54.12           Start Time:   Stop Time: 1140  Total time in clinic (min): 60 minutes    Subjective: Was supposed to see referring provider today but cancelled due to scheduling conflict. Noted LUE sx present today following prolonged positioning in seated - first occurrence of sx since last visit (5 days ago); notes tightness into L UT / scapula. Also notes lumbar radicular sx into distal LEs recently. Objective: See treatment diary below      Assessment: Tolerated treatment well focused on myofascial release in cervical / periscapular region and scapular stabilizer / RTC strengthening. Pt notes radicular LUE sx reproduction with cervical extension, worse in quadruped, and sx abolishment with cervical retractions in quadruped while maintaining a neutral cervical spine. Sx reproduction with cervical extension in seated, minimized following L upper trap stretching. Rest of session focused on scapular stabilizer / RTC strength to unload upper traps during active LUE use. Pt given HEP of Margarita Swenson, ERs, Ws, & naldo for scapular strengthening. Patient would benefit from continued PT to address myofascial restrictions and weakness in scapular musculature. Plan: Continue per plan of care.       Precautions: H/o LBP with radicular sx       Manuals 23 IE  7/  7           Assess S/L scapular mobility   WNL                   Manual trigger point release UT / subscap   5 min Subscap release with handlebar tool x10 min Subscap release with handlebar + middle scalene release x20 min 10 min  Subscap release with handlebar + middle scalene release x20 min           Cervical distraction   [id number]                   Cervical P-A mobilizations                       Neuro Re-Ed                       Median nerve glides     NV 2 x 10 NT             Ulnar nerve glides     NV   NT                       Assess prone positioned thoracic extension                                                                                                             Ther Ex                       Cervical retraction (seated > progress to Qped) HEP x10 seated x20      Sx provocation   10 x 2 NT  Qped 3 x10           Cervical retraction + extension         10X   Attempted - LUE sx reproduction           UT stretch:B       20 sec x 5 20sec 3x   No over pressure at L   5 x 20"           LS stretch:L       20 sec x 5 20sec 3x              Anterior scalene stretch:L       20 sec x 5 NT             Scapular retractions HEP x10                     Rajesh rows   15# 2x10 15# x20   NT  90 ABd Rows 10# x30           [place] ext   10# x10  Sx reproduction 15 x20   NT             Prone IYT     5 sec x10 ea   20X              Tband ER:L      Green x20       Tband Ws      Red x20       Tband Swordpulls:L      Red 2x10       Wall angels      x10       Serratus wall slides      2 x 8                    Thoracic extension against chair back   5x10     Sx relief x10 throughout session 10 x  NT                                     Prone on elbows          2 min 2x              Prone press ups          6x 10  throughouthroughout session   x10; LUE sx reproduction                                   1/2 Foam Roll Series     Pec major S / Pec minor S x 2 min ea  OH raises / Chin tucks / Scap retract x30 ea   NT             Ther Activity                                                                       Gait Training                                                                       Modalities                       MPH cervical

## 2023-07-19 LAB — TESTOST SERPL-MSCNC: 436 NG/DL

## 2023-08-23 NOTE — PROGRESS NOTES
Assessment:  1. Chronic pain syndrome    2. Cervical radiculopathy    3. Lumbar radiculopathy    4. Lumbar disc herniation    5. Lumbar spondylosis    6. Spinal stenosis of lumbar region with neurogenic claudication        Plan:  1. I will order an MRI of the cervical spine without contrast  2. Patient may continue gabapentin and ibuprofen as prescribed. Gabapentin was refilled today  3. We can repeat bilateral L5 TFESI as needed  4. Continue to follow with podiatry as scheduled  5. Continue with home exercise program  6. Follow-up in 6 months or sooner if needed    History of Present Illness: The patient is a 53 y.o. male last seen on 06/02/2023 who presents for a follow up office visit in regards to chronic low back pain with paresthesias into the lower extremities and neck pain with paresthesias radiating into the bilateral upper extremities, left greater than right he denies bowel or bladder incontinence, balance issues or saddle anesthesia. .  Patient did complete a full course of physical therapy for cervical complaints with some improvement of his symptoms. He does still intermittently experience neuropathic complaints into the upper extremities. He feels like his low back and lower extremity symptoms are essentially at baseline he is currently seeing podiatry for Great toe pain which x-ray reveals his severe osteoarthritis. He rates his pain a 1 out of 10 on the numeric pain rating scale. His pain is occasional and it is described as burning, cramping, pressure-like, numbness and pins-and-needles    Current pain medications includes: Gabapentin 900 mg in the morning and 1200 mg at bedtime and over-the-counter ibuprofen as needed. The patient reports that this regimen is providing 90% pain relief. The patient is reporting no side effects from this pain medication regimen.     I have personally reviewed and/or updated the patient's past medical history, past surgical history, family history, social history, current medications, allergies, and vital signs today. Review of Systems:    Review of Systems   Respiratory: Negative for shortness of breath. Cardiovascular: Negative for chest pain. Gastrointestinal: Negative for constipation, diarrhea, nausea and vomiting. Musculoskeletal: Negative for arthralgias, gait problem, joint swelling and myalgias. Skin: Negative for rash. Neurological: Negative for dizziness, seizures and weakness. All other systems reviewed and are negative. Past Medical History:   Diagnosis Date   • Hypertension        No past surgical history on file. Family History   Problem Relation Age of Onset   • Hypertension Father    • Hypertension Brother        Social History     Occupational History   • Not on file   Tobacco Use   • Smoking status: Former   • Smokeless tobacco: Never   • Tobacco comments:     quit 2002   Vaping Use   • Vaping Use: Never used   Substance and Sexual Activity   • Alcohol use: Yes   • Drug use: Not Currently   • Sexual activity: Not on file         Current Outpatient Medications:   •  acetaminophen (TYLENOL) 500 mg tablet, Take 500 mg by mouth every 6 (six) hours as needed for mild pain, Disp: , Rfl:   •  gabapentin (NEURONTIN) 600 MG tablet, Take 1.5 PO AM and 2 PO HS, Disp: 315 tablet, Rfl: 1  •  ibuprofen (MOTRIN) 200 mg tablet, Take by mouth every 6 (six) hours as needed for mild pain, Disp: , Rfl:     No Known Allergies    Physical Exam:    /88   Pulse 82   Ht 5' (1.524 m)   Wt 72.6 kg (160 lb)   BMI 31.25 kg/m²     Constitutional:normal, well developed, well nourished, alert, in no distress and non-toxic and no overt pain behavior.   Eyes:anicteric  HEENT:grossly intact  Neck:supple, symmetric, trachea midline and no masses   Pulmonary:even and unlabored  Cardiovascular:No edema or pitting edema present  Skin:Normal without rashes or lesions and well hydrated  Psychiatric:Mood and affect appropriate  Neurologic:Cranial Nerves II-XII grossly intact  Musculoskeletal:Slightly antalgic gait but steady without assistive devices      Imaging  MRI cervical spine without contrast    (Results Pending)       CERVICAL SPINE   INDICATION: M54.12: Radiculopathy, cervical region. COMPARISON: None   VIEWS: XR SPINE CERVICAL COMPLETE 4 OR 5 VW NON INJURY   FINDINGS:   No fracture. Normal alignment without subluxation. Endplate change with anterior productive spurring at C4-5, C5-6 and C6-7. Mild right C4-5 foraminal narrowing. Left-sided foramina are patent. The prevertebral soft tissues are within normal limits. The lung apices are clear. IMPRESSION:   Mild degenerative change as above.    Workstation performed: [id number]     Orders Placed This Encounter   Procedures   • MRI cervical spine without contrast

## 2023-08-24 ENCOUNTER — OFFICE VISIT (OUTPATIENT)
Dept: PAIN MEDICINE | Facility: CLINIC | Age: 48
End: 2023-08-24
Payer: COMMERCIAL

## 2023-08-24 VITALS
DIASTOLIC BLOOD PRESSURE: 88 MMHG | HEIGHT: 60 IN | HEART RATE: 82 BPM | WEIGHT: 160 LBS | BODY MASS INDEX: 31.41 KG/M2 | SYSTOLIC BLOOD PRESSURE: 122 MMHG

## 2023-08-24 DIAGNOSIS — M54.16 LUMBAR RADICULOPATHY: ICD-10-CM

## 2023-08-24 DIAGNOSIS — M48.062 SPINAL STENOSIS OF LUMBAR REGION WITH NEUROGENIC CLAUDICATION: ICD-10-CM

## 2023-08-24 DIAGNOSIS — G89.4 CHRONIC PAIN SYNDROME: Primary | ICD-10-CM

## 2023-08-24 DIAGNOSIS — M54.12 CERVICAL RADICULOPATHY: ICD-10-CM

## 2023-08-24 DIAGNOSIS — M51.26 LUMBAR DISC HERNIATION: ICD-10-CM

## 2023-08-24 DIAGNOSIS — M47.816 LUMBAR SPONDYLOSIS: ICD-10-CM

## 2023-08-24 PROCEDURE — 99214 OFFICE O/P EST MOD 30 MIN: CPT | Performed by: NURSE PRACTITIONER

## 2023-08-24 RX ORDER — GABAPENTIN 600 MG/1
TABLET ORAL
Qty: 315 TABLET | Refills: 1 | Status: SHIPPED | OUTPATIENT
Start: 2023-08-24

## 2023-09-08 ENCOUNTER — HOSPITAL ENCOUNTER (OUTPATIENT)
Dept: RADIOLOGY | Facility: HOSPITAL | Age: 48
Discharge: HOME | End: 2023-09-08
Payer: COMMERCIAL

## 2023-09-08 DIAGNOSIS — M54.12 CERVICAL RADICULOPATHY: ICD-10-CM

## 2023-09-08 PROCEDURE — G1004 CDSM NDSC: HCPCS

## 2023-09-08 PROCEDURE — 72141 MRI NECK SPINE W/O DYE: CPT

## 2023-09-13 ENCOUNTER — TELEPHONE (OUTPATIENT)
Dept: PAIN MEDICINE | Facility: CLINIC | Age: 48
End: 2023-09-13

## 2023-09-13 NOTE — TELEPHONE ENCOUNTER
----- Message from [employer], [address] [place] [address] sent at 9/13/2023  8:41 AM EDT -----  MRI of the cervical spine reveals arthritic changes at C4-5 causing mild central, moderate right and moderate to severe left foraminal stenosis  otherwise unremarkable

## 2023-09-13 NOTE — TELEPHONE ENCOUNTER
S/W pt and advised of results. He notes he continues to feel "a know" in the left shoulder area as well as tingling down into his left arm. Comes and goes. Has tried lidoderm or biofreeze to the shoulder area with no real relief. I did recommend he try ice to the area. He is asking for advice regarding the tingling into his arm, cause from stenosis?

## 2023-09-13 NOTE — TELEPHONE ENCOUNTER
S/w pt and advised of same. Pt verbalized understanding. Pt would like to try OTC options prior to inj and will c/b to schd when ready.

## 2023-09-23 ENCOUNTER — HOSPITAL ENCOUNTER (EMERGENCY)
Facility: HOSPITAL | Age: 48
Discharge: HOME | End: 2023-09-23
Attending: EMERGENCY MEDICINE | Admitting: EMERGENCY MEDICINE
Payer: COMMERCIAL

## 2023-09-23 VITALS
HEART RATE: 99 BPM | RESPIRATION RATE: 18 BRPM | OXYGEN SATURATION: 96 % | SYSTOLIC BLOOD PRESSURE: 162 MMHG | TEMPERATURE: 97.1 F | WEIGHT: 160 LBS | BODY MASS INDEX: 31.25 KG/M2 | DIASTOLIC BLOOD PRESSURE: 111 MMHG

## 2023-09-23 DIAGNOSIS — S61.212A LACERATION OF RIGHT MIDDLE FINGER: Primary | ICD-10-CM

## 2023-09-23 PROCEDURE — 99282 EMERGENCY DEPT VISIT SF MDM: CPT

## 2023-09-23 PROCEDURE — 12001 RPR S/N/AX/GEN/TRNK 2.5CM/<: CPT | Performed by: EMERGENCY MEDICINE

## 2023-09-23 PROCEDURE — 99284 EMERGENCY DEPT VISIT MOD MDM: CPT | Performed by: EMERGENCY MEDICINE

## 2023-09-23 RX ORDER — LIDOCAINE HYDROCHLORIDE AND EPINEPHRINE 10; 10 MG/ML; UG/ML
10 INJECTION, SOLUTION INFILTRATION; PERINEURAL ONCE
Status: COMPLETED | OUTPATIENT
Start: 2023-09-23 | End: 2023-09-23

## 2023-09-23 RX ADMIN — LIDOCAINE HYDROCHLORIDE,EPINEPHRINE BITARTRATE 10 ML: 10; .01 INJECTION, SOLUTION INFILTRATION; PERINEURAL at 15:51

## 2023-09-23 NOTE — ED ATTENDING ATTESTATION
9/23/2023  IEladia DO, saw and evaluated the patient. I have discussed the patient with the resident/non-physician practitioner and agree with the resident's/non-physician practitioner's findings, Plan of Care, and MDM as documented in the resident's/non-physician practitioner's note, except where noted. All available labs and Radiology studies were reviewed. I was present for key portions of any procedure(s) performed by the resident/non-physician practitioner and I was immediately available to provide assistance. At this point I agree with the current assessment done in the Emergency Department. I have conducted an independent evaluation of this patient a history and physical is as follows:    76-year-old male right middle finger distal laceration 1 cm will require suture cut it on a sharp knife.   Tetanus up-to-date no evidence of tendon involvement or other significant injury    ED Course         Critical Care Time  Procedures

## 2023-09-23 NOTE — DISCHARGE INSTRUCTIONS
Please follow-up with primary care provider or the emergency department in 7 to 10 days to have your stitches removed. Please keep the wound clean. Do not soak the wound in water. Watch out for signs of infection and call your doctor or return to the ED with new or worsening symptoms-see attached.

## 2023-09-23 NOTE — ED PROVIDER NOTES
History  Chief Complaint   Patient presents with   • Finger Laceration     Pt cut his R middle finger with a razor doing home repair     Patient is a 43-year-old male presenting with finger laceration. 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 The cut is on the right middle finger. He immediately wrapped it up with paper towels and the bleeding stopped. He states that he just had a tetanus shot within the last few months. Patient states he has full mobility and feeling in his finger. Patient denies any other symptoms including fever, chills, diaphoresis, headache, lightheadedness, chest pain, abdominal pain, numbness, tingling, or weakness. Prior to Admission Medications   Prescriptions Last Dose Informant Patient Reported? Taking?   acetaminophen (TYLENOL) 500 mg tablet   Yes No   Sig: Take 500 mg by mouth every 6 (six) hours as needed for mild pain   gabapentin (NEURONTIN) 600 MG tablet   No No   Sig: Take 1.5 PO AM and 2 PO HS   ibuprofen (MOTRIN) 200 mg tablet   Yes No   Sig: Take by mouth every 6 (six) hours as needed for mild pain      Facility-Administered Medications: None       Past Medical History:   Diagnosis Date   • Hypertension        History reviewed. No pertinent surgical history. Family History   Problem Relation Age of Onset   • Hypertension Father    • Hypertension Brother      I have reviewed and agree with the history as documented. E-Cigarette/Vaping   • E-Cigarette Use Never User      E-Cigarette/Vaping Substances   • Nicotine No    • THC No    • CBD No    • Flavoring No    • Other No    • Unknown No      Social History     Tobacco Use   • Smoking status: Former   • Smokeless tobacco: Never   • Tobacco comments:     quit 2002   Vaping Use   • Vaping Use: Never used   Substance Use Topics   • Alcohol use:  Yes   • Drug use: Not Currently        Review of Systems    Physical Exam  ED Triage Vitals [09/23/23 1533] Temperature Pulse Respirations Blood Pressure SpO2   (!) 97.1 °F (36.2 °C) 99 18 (!) 162/111 96 %      Temp Source Heart Rate Source Patient Position - Orthostatic VS BP Location FiO2 (%)   Temporal Monitor Sitting Left arm --      Pain Score       4             Orthostatic Vital Signs  Vitals:    09/23/23 1533   BP: (!) 162/111   Pulse: 99   Patient Position - Orthostatic VS: Sitting       Physical Exam  Vitals and nursing note reviewed. Constitutional:       General: He is not in acute distress. Appearance: Normal appearance. He is not ill-appearing, toxic-appearing or diaphoretic. HENT:      Head: Normocephalic and atraumatic. Mouth/Throat:      Mouth: Mucous membranes are moist.   Eyes:      Conjunctiva/sclera: Conjunctivae normal.   Cardiovascular:      Rate and Rhythm: Normal rate and regular rhythm. Pulmonary:      Effort: Pulmonary effort is normal. No respiratory distress. Musculoskeletal:         General: Normal range of motion. Cervical back: Normal range of motion. Skin:     General: Skin is warm and dry. Findings: Lesion (1.5 cm laceration on the right middle finger) present. Neurological:      General: No focal deficit present. Mental Status: He is alert and oriented to person, place, and time. Sensory: No sensory deficit. Motor: No weakness. ED Medications  Medications   lidocaine-epinephrine (XYLOCAINE/EPINEPHRINE) 1 %-1:100,000 injection 10 mL (10 mL Infiltration Given by Other 9/23/23 5782)       Diagnostic Studies  Results Reviewed     None                 No orders to display         Procedures  Universal Protocol:  Consent: Verbal consent obtained.   Risks and benefits: risks, benefits and alternatives were discussed  Consent given by: patient  Patient understanding: patient states understanding of the procedure being performed  Patient consent: the patient's understanding of the procedure matches consent given  Procedure consent: procedure consent matches procedure scheduled  Relevant documents: relevant documents present and verified  Test results: test results available and properly labeled  Site marked: the operative site was marked  Required items: required blood products, implants, devices, and special equipment available  Patient identity confirmed: verbally with patient and arm band    Laceration repair    Date/Time: 9/23/2023 4:21 PM    Performed by: Ying Gupta MD  Authorized by: Ying Gupta MD  Body area: upper extremity  Location details: right long finger  Laceration length: 1.5 cm  Foreign bodies: no foreign bodies  Tendon involvement: none  Nerve involvement: none  Vascular damage: no  Anesthesia: local infiltration    Anesthesia:  Local Anesthetic: lidocaine 1% with epinephrine  Anesthetic total: 2 mL    Sedation:  Patient sedated: no      Wound Dehiscence:  Superficial Wound Dehiscence: simple closure      Procedure Details:  Preparation: Patient was prepped and draped in the usual sterile fashion. Irrigation solution: saline  Amount of cleaning: standard  Debridement: none  Degree of undermining: none  Skin closure: 4-0 Prolene  Number of sutures: 4  Technique: simple  Approximation: close  Approximation difficulty: simple  Patient tolerance: patient tolerated the procedure well with no immediate complications            ED Course                             SBIRT 22yo+    Flowsheet Row Most Recent Value   Initial Alcohol Screen: US AUDIT-C     1. How often do you have a drink containing alcohol? 0 Filed at: 09/23/2023 1535   2. How many drinks containing alcohol do you have on a typical day you are drinking? 0 Filed at: 09/23/2023 1535   3a. Male UNDER 65: How often do you have five or more drinks on one occasion? 0 Filed at: 09/23/2023 1535   3b. FEMALE Any Age, or MALE 65+: How often do you have 4 or more drinks on one occassion?  0 Filed at: 09/23/2023 1535   Audit-C Score 0 Filed at: 09/23/2023 1535   NANCY: How many times in the past year have you. .. Used an illegal drug or used a prescription medication for non-medical reasons? Never Filed at: 09/23/2023 [address] [place] [address]                 Medical Decision Making  Patient is a 52-year-old male presenting with right finger laceration. Patient has a clean, simple incision of the right middle finger. There are no foreign bodies. He has had a tetanus shot recently. Patient received 4 simple interrupted sutures in the finger and was cleared for discharge with return precautions. Risk  Prescription drug management. Disposition  Final diagnoses:   Laceration of right middle finger     Time reflects when diagnosis was documented in both MDM as applicable and the Disposition within this note     Time User Action Codes Description Comment    9/23/2023  4:21 PM SarahDipesh [email or web address] Add [X01.907A] Laceration of right middle finger       ED Disposition     ED Disposition   Discharge    Condition   Stable    Date/Time   Sat Sep 23, 2023  4:21 PM    [address] [place] [address] discharge to home/self care. Follow-up Information    None         Discharge Medication List as of 9/23/2023  4:22 PM      CONTINUE these medications which have NOT CHANGED    Details   acetaminophen (TYLENOL) 500 mg tablet Take 500 mg by mouth every 6 (six) hours as needed for mild pain, Historical Med      gabapentin (NEURONTIN) 600 MG tablet Take 1.5 PO AM and 2 PO HS, Normal      ibuprofen (MOTRIN) 200 mg tablet Take by mouth every 6 (six) hours as needed for mild pain, Historical Med           No discharge procedures on file. PDMP Review       Value Time User    PDMP Reviewed  Yes 4/1/2020 10:32 AM Violeta Mcmahan DO           ED Provider  Attending physically available and evaluated Ankita Townsend. I managed the patient along with the ED Attending.     Electronically Signed by         Audelia Morgan MD  09/23/23 3193 Comments: Pt reports no erythema, pain, or burning from the previous treatment. Patient wore a gown that was provided and denied safety googles as she has goggles. Pt would like to increase +15% today. \\n\\nPatient reports a possible new spot on left inner knee. We took a photo to send to [facility] today and will let pt know if we can start treating.

## 2024-02-19 NOTE — PROGRESS NOTES
Assessment:  1. Chronic pain syndrome    2. Lumbar radiculopathy    3. Lumbar disc herniation    4. Lumbar spondylosis    5. Spinal stenosis of lumbar region with neurogenic claudication    6. Cervical radiculopathy        Plan:  Patient will be for C6-7 cervical epidural steroid injection to address the inflammatory component patient's pain.  Complete risks and benefits including bleeding, infection, tissue reaction, nerve injury and allergic reaction were discussed. The patient was agreeable and verbalized an understanding  We can repeat bilateral L5 TFESI.  Patient may continue gabapentin as prescribed.  Medication was refilled today  Continue with an exercise program  Follow-up in 6 months or sooner if needed    History of Present Illness:    The patient is a 48 y.o. male last seen on 08/24/2023  who presents for a follow up office visit in regards to chronic left-sided neck pain that radiates into the scapula and into the left upper extremity with associated numbness and paresthesias low back pain with infrequent and intermittent lower extremity symptoms. he denies bowel or bladder incontinence or saddle anesthesia.  He did have improvement in the past with bilateral L5 TFESI.  He states his low back and leg symptoms are at baseline.  He did have an MRI cervical spine which revealed arthritic changes at C4-5 causing mild central, moderate right and moderate to severe left foraminal stenosis otherwise unremarkable.    the patient rates his pain a 2 out of 10 on the numeric pain rating scale.  The pain will intermittently flare to a 5 out of 10 on the numeric pain rating scale.  Pain is occasional at night and it is described as dull aching, cramping, pressure-like, numbness and pins-and-needles    Current pain medications includes: Gabapentin 900 mg in the morning and 1200 mg at bedtime, ibuprofen as needed and Tylenol. .  The patient reports that this regimen is providing 85% pain relief.  The patient is  reporting no side effects from this pain medication regimen.    I have personally reviewed and/or updated the patient's past medical history, past surgical history, family history, social history, current medications, allergies, and vital signs today.       Review of Systems:    Review of Systems      Past Medical History:   Diagnosis Date    Hypertension        No past surgical history on file.    Family History   Problem Relation Age of Onset    Hypertension Father     Hypertension Brother        Social History     Occupational History    Not on file   Tobacco Use    Smoking status: Former    Smokeless tobacco: Never    Tobacco comments:     quit 2002   Vaping Use    Vaping status: Never Used   Substance and Sexual Activity    Alcohol use: Yes    Drug use: Not Currently    Sexual activity: Not on file         Current Outpatient Medications:     acetaminophen (TYLENOL) 500 mg tablet, Take 500 mg by mouth every 6 (six) hours as needed for mild pain, Disp: , Rfl:     gabapentin (NEURONTIN) 600 MG tablet, Take 1.5 PO AM and 2 PO HS, Disp: 315 tablet, Rfl: 1    ibuprofen (MOTRIN) 200 mg tablet, Take by mouth every 6 (six) hours as needed for mild pain, Disp: , Rfl:     No Known Allergies    Physical Exam:    /87 (BP Location: Left arm, Patient Position: Sitting, Cuff Size: Large)   Pulse 80   Ht 5' (1.524 m)   Wt 72.6 kg (160 lb)   BMI 31.25 kg/m²     Constitutional:normal, well developed, well nourished, alert, in no distress and non-toxic and no overt pain behavior.  Eyes:anicteric  HEENT:grossly intact  Neck:supple, symmetric, trachea midline and no masses   Pulmonary:even and unlabored  Cardiovascular:No edema or pitting edema present  Skin:Normal without rashes or lesions and well hydrated  Psychiatric:Mood and affect appropriate  Neurologic:Cranial Nerves II-XII grossly intact  Musculoskeletal:normal gait.  Left cervical paraspinal trapezium musculature tender to palpation.  Full range of motion in all  planes of the cervical spine.  Equivocal Spurling's to the left      Imaging  [facility] spine and pain procedure    (Results Pending)     Narrative & Impression  MRI CERVICAL SPINE WITHOUT CONTRAST     INDICATION: M54.12: Radiculopathy, cervical region.     COMPARISON: Cervical radiographs 6/8/2023     TECHNIQUE:  Multiplanar, multisequence imaging of the cervical spine was performed. .        IMAGE QUALITY:  Diagnostic     FINDINGS:     ALIGNMENT: There is straightening of the mid to upper cervical lordosis.  No compression fracture.  No subluxation.  No scoliosis.     MARROW SIGNAL:  Normal marrow signal is identified within the visualized bony structures.  No discrete marrow lesion.     CERVICAL AND VISUALIZED THORACIC CORD:  Normal signal within the visualized cord.     PREVERTEBRAL AND PARASPINAL SOFT TISSUES:  Normal.     VISUALIZED POSTERIOR FOSSA:  The visualized posterior fossa demonstrates no abnormal signal.     CERVICAL DISC SPACES:     C2-3: No focal disc herniation, central canal stenosis, or neural foraminal narrowing.     C3-4: No focal disc herniation, central canal stenosis, or neural foraminal narrowing.     C4-5: Diffuse disc osteophyte complex with bilateral uncovertebral hypertrophy partially effacing the ventral subarachnoid space. Mild central canal narrowing. Moderate right and moderate to severe left neural foraminal stenosis.     C5-6 - C7-T1: No focal disc herniation, central canal stenosis, or neural foraminal narrowing.     UPPER THORACIC DISC SPACES:  Normal.        IMPRESSION:     Diffuse disc osteophyte complex is noted at the C4-5 level with mild central canal narrowing.     The cervical cord appears normal in caliber and signal with no evidence of focal impingement.     Workstation performed: [id number]      Orders Placed This Encounter   Procedures    [facility] spine and pain procedure

## 2024-02-20 ENCOUNTER — OFFICE VISIT (OUTPATIENT)
Dept: PAIN MEDICINE | Facility: CLINIC | Age: 49
End: 2024-02-20
Payer: COMMERCIAL

## 2024-02-20 VITALS
DIASTOLIC BLOOD PRESSURE: 87 MMHG | HEART RATE: 80 BPM | HEIGHT: 60 IN | SYSTOLIC BLOOD PRESSURE: 130 MMHG | BODY MASS INDEX: 31.41 KG/M2 | WEIGHT: 160 LBS

## 2024-02-20 DIAGNOSIS — G89.4 CHRONIC PAIN SYNDROME: Primary | ICD-10-CM

## 2024-02-20 DIAGNOSIS — M54.16 LUMBAR RADICULOPATHY: ICD-10-CM

## 2024-02-20 DIAGNOSIS — M47.816 LUMBAR SPONDYLOSIS: ICD-10-CM

## 2024-02-20 DIAGNOSIS — M48.062 SPINAL STENOSIS OF LUMBAR REGION WITH NEUROGENIC CLAUDICATION: ICD-10-CM

## 2024-02-20 DIAGNOSIS — M54.12 CERVICAL RADICULOPATHY: ICD-10-CM

## 2024-02-20 DIAGNOSIS — M51.26 LUMBAR DISC HERNIATION: ICD-10-CM

## 2024-02-20 PROCEDURE — 99214 OFFICE O/P EST MOD 30 MIN: CPT | Performed by: NURSE PRACTITIONER

## 2024-02-20 RX ORDER — GABAPENTIN 600 MG/1
TABLET ORAL
Qty: 315 TABLET | Refills: 1 | Status: SHIPPED | OUTPATIENT
Start: 2024-02-20

## 2024-03-06 ENCOUNTER — TELEPHONE (OUTPATIENT)
Age: 49
End: 2024-03-06

## 2024-03-06 ENCOUNTER — HOSPITAL ENCOUNTER (OUTPATIENT)
Dept: RADIOLOGY | Facility: CLINIC | Age: 49
Discharge: HOME | End: 2024-03-06
Payer: COMMERCIAL

## 2024-03-06 VITALS
TEMPERATURE: 97.3 F | DIASTOLIC BLOOD PRESSURE: 87 MMHG | HEART RATE: 77 BPM | OXYGEN SATURATION: 97 % | SYSTOLIC BLOOD PRESSURE: 137 MMHG | RESPIRATION RATE: 18 BRPM

## 2024-03-06 DIAGNOSIS — M54.12 CERVICAL RADICULOPATHY: ICD-10-CM

## 2024-03-06 PROCEDURE — 62321 NJX INTERLAMINAR CRV/THRC: CPT | Performed by: ANESTHESIOLOGY

## 2024-03-06 RX ORDER — PAPAVERINE HCL 150 MG
10 CAPSULE, EXTENDED RELEASE ORAL ONCE
Status: COMPLETED | OUTPATIENT
Start: 2024-03-06 | End: 2024-03-06

## 2024-03-06 RX ADMIN — IOHEXOL 1 ML: 300 INJECTION, SOLUTION INTRAVENOUS at 16:05

## 2024-03-06 RX ADMIN — DEXAMETHASONE SODIUM PHOSPHATE 10 MG: 10 INJECTION, SOLUTION INTRAMUSCULAR; INTRAVENOUS at 16:06

## 2024-03-06 NOTE — H&P
History of Present Illness: The patient is a 48 y.o. male who presents with complaints of neck and arm pain.    Past Medical History:   Diagnosis Date    Hypertension        History reviewed. No pertinent surgical history.      Current Outpatient Medications:     acetaminophen (TYLENOL) 500 mg tablet, Take 500 mg by mouth every 6 (six) hours as needed for mild pain, Disp: , Rfl:     gabapentin (NEURONTIN) 600 MG tablet, Take 1.5 PO AM and 2 PO HS, Disp: 315 tablet, Rfl: 1    ibuprofen (MOTRIN) 200 mg tablet, Take by mouth every 6 (six) hours as needed for mild pain, Disp: , Rfl:     No Known Allergies    Physical Exam:   Vitals:    03/06/24 1538   BP: 149/90   Pulse: 90   Resp: 18   Temp: (!) 97.3 °F (36.3 °C)     General: Awake, Alert, Oriented x 3, Mood and affect appropriate  Respiratory: Respirations even and unlabored  Cardiovascular: Peripheral pulses intact; no edema  Musculoskeletal Exam: Bilateral cervical paraspinals tender to palpation    ASA Score: 2    Patient/Chart Verification  Patient ID Verified: Verbal  ID Band Applied: No  Consents Confirmed: Procedural, To be obtained in the Pre-Procedure area  Interval H&P(within 24 hr) Complete (required for Outpatients and Surgery Admit only): To be obtained in the Pre-Procedure area  Allergies Reviewed: Yes  Anticoag/NSAID held?: NA (stopped ibuprofen 2/29)  Currently on antibiotics?: No    Assessment:   1. Cervical radiculopathy        Plan: C6-7 MANPREET

## 2024-03-06 NOTE — DISCHARGE INSTRUCTIONS
Epidural Steroid Injection   WHAT YOU NEED TO KNOW:   An epidural steroid injection (JANINA) is a procedure to inject steroid medicine into the epidural space. The epidural space is between your spinal cord and vertebrae. Steroids reduce inflammation and fluid buildup in your spine that may be causing pain. You may be given pain medicine along with the steroids.          ACTIVITY  Do not drive or operate machinery today.  No strenuous activity today - bending, lifting, etc.  You may resume normal activites starting tomorrow - start slowly and as tolerated.  You may shower today, but no tub baths or hot tubs.  You may have numbness for several hours from the local anesthetic. Please use caution and common sense, especially with weight-bearing activities.    CARE OF THE INJECTION SITE  If you have soreness or pain, apply ice to the area today (20 minutes on/20 minutes off).  Starting tomorrow, you may use warm, moist heat or ice if needed.  You may have an increase or change in your discomfort for 36-48 hours after your treatment.  Apply ice and continue with any pain medication you have been prescribed.  Notify [facility] if you have any of the following: redness, drainage, swelling, headache, stiff neck or fever above 100°F.    SPECIAL INSTRUCTIONS  Our office will contact you in approximately 7 days for a progress report.    MEDICATIONS  Continue to take all routine medications.  Our office may have instructed you to hold some medications.    As no general anesthesia was used in today's procedure, you should not experience any side effects related to anesthesia.     If you are diabetic, the steroids used in today's injection may temporarily increase your blood sugar levels after the first few days after your injection. Please keep a close eye on your sugars and alert the doctor who manages your diabetes if your sugars are significantly high from your baseline or you are symptomatic.     If you have a  problem specifically related to your procedure, please call our office at [phone].  Problems not related to your procedure should be directed to your primary care physician.

## 2024-03-13 ENCOUNTER — TELEPHONE (OUTPATIENT)
Dept: PAIN MEDICINE | Facility: CLINIC | Age: 49
End: 2024-03-13

## 2024-03-20 NOTE — TELEPHONE ENCOUNTER
Caller: Ronald  Doctor/office: Will  #: [phone]    % of improvement: 30-40%  Pain Scale (1-10): 3-4/10

## 2024-05-31 ENCOUNTER — OFFICE VISIT (OUTPATIENT)
Dept: FAMILY MEDICINE CLINIC | Facility: CLINIC | Age: 49
End: 2024-05-31
Payer: COMMERCIAL

## 2024-05-31 VITALS
HEIGHT: 60 IN | RESPIRATION RATE: 18 BRPM | OXYGEN SATURATION: 97 % | TEMPERATURE: 97.3 F | SYSTOLIC BLOOD PRESSURE: 132 MMHG | BODY MASS INDEX: 31.61 KG/M2 | DIASTOLIC BLOOD PRESSURE: 84 MMHG | WEIGHT: 161 LBS | HEART RATE: 86 BPM

## 2024-05-31 DIAGNOSIS — E78.2 MIXED HYPERLIPIDEMIA: ICD-10-CM

## 2024-05-31 DIAGNOSIS — R73.01 IFG (IMPAIRED FASTING GLUCOSE): ICD-10-CM

## 2024-05-31 DIAGNOSIS — G89.4 CHRONIC PAIN SYNDROME: ICD-10-CM

## 2024-05-31 DIAGNOSIS — R03.0 ELEVATED BLOOD-PRESSURE READING WITHOUT DIAGNOSIS OF HYPERTENSION: ICD-10-CM

## 2024-05-31 DIAGNOSIS — Z00.00 ANNUAL PHYSICAL EXAM: Primary | ICD-10-CM

## 2024-05-31 PROCEDURE — 99396 PREV VISIT EST AGE 40-64: CPT | Performed by: NURSE PRACTITIONER

## 2024-05-31 NOTE — PATIENT INSTRUCTIONS
DASH Eating Plan   WHAT YOU NEED TO KNOW:   The DASH (Dietary Approaches to Stop Hypertension) Eating Plan is designed to help prevent or lower high blood pressure. It can also help to lower LDL (bad) cholesterol and decrease your risk for heart disease. The plan is low in sodium, sugar, unhealthy fats, and total fat. It is high in potassium, calcium, magnesium, and fiber. These nutrients are added when you eat more fruits, vegetables, and whole grains. With the DASH eating plan, you need to eat a certain number of servings from each food group. This will help you get enough of certain nutrients and limit others. The amount of servings you should eat depends on how many calories you need. Your dietitian can help you create meal plans with the right number of servings for each food group.       DISCHARGE INSTRUCTIONS:   What you need to know about sodium:  Your dietitian will tell you how much sodium is safe for you to have each day. People with high blood pressure should have no more than 1,500 to 2,300 mg of sodium in a day. A teaspoon (tsp) of salt has 2,300 mg of sodium. This may seem like a difficult goal, but small changes to the foods you eat can make a big difference. Your healthcare provider or dietitian can help you create a meal plan that follows your sodium limit.  Read food labels.  Food labels can help you choose foods that are low in sodium. The amount of sodium is listed in milligrams (mg). The % Daily Value (DV) column tells you how much of your daily needs are met by 1 serving of the food for each nutrient listed. Choose foods that have less than 5% of the DV of sodium. These foods are considered low in sodium. Foods that have 20% or more of the DV of sodium are considered high in sodium. Avoid foods that have more than 300 mg of sodium in each serving. Choose foods that say low-sodium, reduced-sodium, or no salt added on the food label.         Limit added salt.  Do not salt food at the table  if you add salt when you cook. Use herbs and spices, such as onions, garlic, and salt-free seasonings to add flavor. Try lemon or lime juice or vinegar to add a tart flavor. Use hot peppers or a small amount of hot pepper sauce to add a spicy flavor. Limit foods high in added salt, such as the following:    Seasonings made with salt, such as garlic salt, celery salt, onion salt, seasoned salt, meat tenderizers, and monosodium glutamate (MSG)    Miso soup and canned or dried soup mixes    Regular soy sauce, barbecue sauce, teriyaki sauce, steak sauce, Worcestershire sauce, and most flavored vinegars    Snack foods, such as salted chips, popcorn, pretzels, pork rinds, salted crackers, and salted nuts    Frozen foods, such as dinners, entrees, vegetables with sauces, and breaded meats    Ask about salt substitutes.  Ask your healthcare provider if you may use salt substitutes. Some salt substitutes have ingredients that can be harmful if you have certain health conditions.    Choose foods carefully at restaurants.  Meals from restaurants, especially fast food restaurants, are often high in sodium. Some restaurants have nutrition information that tells you the amount of sodium in their foods. Ask to have your food prepared with less, or no salt.    What you need to know about fats:  Healthy fats include unsaturated fats and omega-3 fatty acids. Unhealthy fats include saturated fats and trans fats.  Include healthy fats, such as the following:      Cooking oils, such as soybean, canola, olive, or sunflower    Fatty fish, such as salmon, tuna, mackerel, or sardines    Flaxseed oil or ground flaxseed    ½ cup of cooked beans, such as black beans, kidney beans, or alves beans    1½ ounces of low-sodium nuts, such as almonds or walnuts    Low-sugar, low-sodium peanut butter    Seeds such as georgi seeds or sunflower seeds       Limit or do not have unhealthy fats, such as the following:      Foods that contain fat from  animals, such as fatty meats, whole milk, butter, and cream    Shortening, stick margarine, palm oil, and coconut oil    Full-fat or creamy salad dressing    Creamy soup    Crackers, chips, and baked goods made with margarine or shortening    Foods that are fried in unhealthy fats    Gravy and sauces, such as Jimmie or cheese sauces    What you need to know about carbohydrates (carbs):  All carbs break down into sugar. Complex carbs contain more fiber than simple carbs. This means complex carbs go into the bloodstream more slowly and cause less of a blood sugar spike. Try to include more complex carbs and fewer simple carbs.  Include complex carbs, such as the followin slice of whole-grain bread    1 ounce of dry cereal that does not contain added sugar    ½ cup of cooked oatmeal    2 ounces of cooked whole-grain pasta    ½ cup of cooked brown rice    Limit or do not have simple carbs, such as the following:      Baked goods, such as doughnuts, pastries, and cookies    Mixes for cornbread and biscuits    White rice and pasta mixes, such as boxed macaroni and cheese    Instant and cold cereals that contain sugar    Jelly, jam, and ice cream that contain sugar    Condiments such as ketchup    Drinks high in sugar, such as soft drinks, lemonade, and fruit juice    What you need to know about vegetables and fruits:  Vegetables and fruits can be fresh, frozen, or canned. If possible, try to choose low-sodium canned options.  Include a variety of vegetables and fruits, such as the followin medium apple, pear, or peach (about ½ cup chopped)    ½ small banana    ½ cup berries, such as blueberries, strawberries, or blackberries    1 cup of raw leafy greens, such as lettuce, spinach, kale, or bridget greens    ½ cup of frozen or canned (no added salt) vegetables, such as green beans    ½ cup of fresh, frozen, or canned fruit (canned in light syrup or fruit juice)    ½ cup of vegetable or fruit  juice    Limit or do not have vegetables and fruits made in the following ways:      Frozen fruit such as cherries that have added sugar    Fruit in cream or butter sauce    Canned vegetables that are high in sodium    Sauerkraut, pickled vegetables, and other foods prepared in brine    Fried vegetables or vegetables in butter or high-fat sauces    What you need to know about protein foods:   Include lean or low-fat protein foods, such as the following:      Poultry (chicken, turkey) with no skin    Fish (especially fatty fish, such as salmon, fresh tuna, or mackerel)    Lean beef and pork (loin, round, extra lean hamburger)    Egg whites and egg substitutes    1 cup of nonfat (skim) or 1% milk    1½ ounces of fat-free or low-fat cheese    6 ounces of nonfat or low-fat yogurt    Limit or do not have high-fat protein foods, such as the following:      Smoked or cured meat, such as corned beef, nuno, ham, hot dogs, and sausage    Canned beans and canned meats or spreads, such as potted meats, sardines, anchovies, and imitation seafood    Deli or lunch meats, such as bologna, ham, turkey, and roast beef    High-fat meat (T-bone steak, regular hamburger, and ribs)    Whole eggs and egg yolks    Whole milk, 2% milk, and cream    Regular cheese and processed cheese    Other guidelines to follow:   Maintain a healthy weight.  Your risk for heart disease is higher if you have extra weight. Ask your healthcare provider what a healthy weight is for you. Your provider may suggest that you lose weight. You can lose weight by eating fewer calories and foods that have added sugars and fat. The DASH meal plan can help you do this. Decrease calories by eating smaller portions at each meal and fewer snacks. Ask your provider for more information about how to lose weight.    Exercise regularly.  Regular exercise can help you reach or maintain a healthy weight. Regular exercise can also help decrease your blood pressure and improve  your cholesterol levels. Get 30 minutes or more of moderate exercise each day of the week. To lose weight, get at least 60 minutes of exercise. Talk to your healthcare provider about the best exercise program for you.         Limit alcohol.  Women should limit alcohol to 1 drink a day. Men should limit alcohol to 2 drinks a day. A drink of alcohol is 12 ounces of beer, 5 ounces of wine, or 1½ ounces of liquor.    For more information:   [facility]  [address] [address]  [place] [address]  Phone: [phone]  Web Address: http://www.nhlbi.nih.gov/health/infoctr/index.htm    © Copyright [employer] 2023 Information is for End User's use only and may not be sold, redistributed or otherwise used for commercial purposes.  The above information is an  only. It is not intended as medical advice for individual conditions or treatments. Talk to your doctor, nurse or pharmacist before following any medical regimen to see if it is safe and effective for you.  Wellness Visit for Adults   AMBULATORY CARE:   A wellness visit  is when you see your healthcare provider to get screened for health problems. Your healthcare provider will also give you advice on how to stay healthy. Write down your questions so you remember to ask them. Ask your healthcare provider how often you should have a wellness visit.  What happens at a wellness visit:  Your healthcare provider will ask about your health, and your family history of health problems. This includes high blood pressure, heart disease, and cancer. He or she will ask if you have symptoms that concern you, if you smoke, and about your mood. You may also be asked about your intake of medicines, supplements, food, and alcohol. Any of the following may be done:  Your weight  will be checked. Your height may also be checked so your body mass index (BMI) can be calculated. Your BMI shows if you are at a healthy  weight.    Your blood pressure  and heart rate will be checked. Your temperature may also be checked.    Blood and urine tests  may be done. Blood tests may be done to check your cholesterol levels. Abnormal cholesterol levels increase your risk for heart disease and stroke. You may also need a blood or urine test to check for diabetes if you are at increased risk. Urine tests may be done to look for signs of an infection or kidney disease.    A physical exam  includes checking your heartbeat and lungs with a stethoscope. Your healthcare provider may also check your skin to look for sun damage.    Screening tests  may be recommended. A screening test is done to check for diseases that may not cause symptoms. The screening tests you may need depend on your age, gender, family history, and lifestyle habits. For example, colorectal screening may be recommended if you are 50 years old or older.    Screening tests you need if you are a woman:   A Pap smear  is used to screen for cervical cancer. Pap smears are usually done every 3 to 5 years depending on your age. You may need them more often if you have had abnormal Pap smear test results in the past. Ask your healthcare provider how often you should have a Pap smear.    A mammogram  is an x-ray of your breasts to screen for breast cancer. Experts recommend mammograms every 2 years starting at age 50 years. You may need a mammogram at age 49 years or younger if you have an increased risk for breast cancer. Talk to your healthcare provider about when you should start having mammograms and how often you need them.    Vaccines you may need:   Get an influenza vaccine  every year. The influenza vaccine protects you from the flu. Several types of viruses cause the flu. The viruses change over time, so new vaccines are made each year.    Get a tetanus-diphtheria (Td) booster vaccine  every 10 years. This vaccine protects you against tetanus and diphtheria. Tetanus is a severe  infection that may cause painful muscle spasms and lockjaw. Diphtheria is a severe bacterial infection that causes a thick covering in the back of your mouth and throat.    Get a human papillomavirus (HPV) vaccine  if you are female and aged 19 to 26 or male 19 to 21 and never received it. This vaccine protects you from HPV infection. HPV is the most common infection spread by sexual contact. HPV may also cause vaginal, penile, and anal cancers.    Get a pneumococcal vaccine  if you are aged 65 years or older. The pneumococcal vaccine is an injection given to protect you from pneumococcal disease. Pneumococcal disease is an infection caused by pneumococcal bacteria. The infection may cause pneumonia, meningitis, or an ear infection.    Get a shingles vaccine  if you are 60 or older, even if you have had shingles before. The shingles vaccine is an injection to protect you from the varicella-zoster virus. This is the same virus that causes chickenpox. Shingles is a painful rash that develops in people who had chickenpox or have been exposed to the virus.    How to eat healthy:  My Plate is a model for planning healthy meals. It shows the types and amounts of foods that should go on your plate. Fruits and vegetables make up about half of your plate, and grains and protein make up the other half. A serving of dairy is included on the side of your plate. The amount of calories and serving sizes you need depends on your age, gender, weight, and height. Examples of healthy foods are listed below:  Eat a variety of vegetables  such as dark green, red, and orange vegetables. You can also include canned vegetables low in sodium (salt) and frozen vegetables without added butter or sauces.    Eat a variety of fresh fruits , canned fruit in 100% juice, frozen fruit, and dried fruit.    Include whole grains.  At least half of the grains you eat should be whole grains. Examples include whole-wheat bread, wheat pasta, brown rice,  and whole-grain cereals such as oatmeal.    Eat a variety of protein foods such as seafood (fish and shellfish), lean meat, and poultry without skin (turkey and chicken). Examples of lean meats include pork leg, shoulder, or tenderloin, and beef round, sirloin, tenderloin, and extra lean ground beef. Other protein foods include eggs and egg substitutes, beans, peas, soy products, nuts, and seeds.    Choose low-fat dairy products such as skim or 1% milk or low-fat yogurt, cheese, and cottage cheese.    Limit unhealthy fats  such as butter, hard margarine, and shortening.       Exercise:  Exercise at least 30 minutes per day on most days of the week. Some examples of exercise include walking, biking, dancing, and swimming. You can also fit in more physical activity by taking the stairs instead of the elevator or parking farther away from stores. Include muscle strengthening activities 2 days each week. Regular exercise provides many health benefits. It helps you manage your weight, and decreases your risk for type 2 diabetes, heart disease, stroke, and high blood pressure. Exercise can also help improve your mood. Ask your healthcare provider about the best exercise plan for you.       General health and safety guidelines:   Do not smoke.  Nicotine and other chemicals in cigarettes and cigars can cause lung damage. Ask your healthcare provider for information if you currently smoke and need help to quit. E-cigarettes or smokeless tobacco still contain nicotine. Talk to your healthcare provider before you use these products.    Limit alcohol.  A drink of alcohol is 12 ounces of beer, 5 ounces of wine, or 1½ ounces of liquor.    Lose weight, if needed.  Being overweight increases your risk of certain health conditions. These include heart disease, high blood pressure, type 2 diabetes, and certain types of cancer.    Protect your skin.  Do not sunbathe or use tanning beds. Use sunscreen with a SPF 15 or higher. Apply  sunscreen at least 15 minutes before you go outside. Reapply sunscreen every 2 hours. Wear protective clothing, hats, and sunglasses when you are outside.    Drive safely.  Always wear your seatbelt. Make sure everyone in your car wears a seatbelt. A seatbelt can save your life if you are in an accident. Do not use your cell phone when you are driving. This could distract you and cause an accident. Pull over if you need to make a call or send a text message.    Practice safe sex.  Use latex condoms if are sexually active and have more than one partner. Your healthcare provider may recommend screening tests for sexually transmitted infections (STIs).    Wear helmets, lifejackets, and protective gear.  Always wear a helmet when you ride a bike or motorcycle, go skiing, or play sports that could cause a head injury. Wear protective equipment when you play sports. Wear a lifejacket when you are on a boat or doing water sports.    © Copyright [employer] 2023 Information is for End User's use only and may not be sold, redistributed or otherwise used for commercial purposes.  The above information is an  only. It is not intended as medical advice for individual conditions or treatments. Talk to your doctor, nurse or pharmacist before following any medical regimen to see if it is safe and effective for you.    Weight Management   AMBULATORY CARE:   Why it is important to manage your weight:  Being overweight increases your risk of health conditions such as heart disease, high blood pressure, type 2 diabetes, and certain types of cancer. It can also increase your risk for osteoarthritis, sleep apnea, and other respiratory problems. Aim for a slow, steady weight loss. Even a small amount of weight loss can lower your risk of health problems.  Risks of being overweight:  Extra weight can cause many health problems, including the following:  Diabetes (high blood sugar level)    High blood pressure or high  cholesterol    Heart disease    Stroke    Gallbladder or liver disease    Cancer of the colon, breast, prostate, liver, or kidney    Sleep apnea    Arthritis or gout    Screening  is done to check for health conditions before you have signs or symptoms. If you are 35 to 70 years old, your blood sugar level may be checked every 3 years for signs of prediabetes or diabetes. Your healthcare provider will check your blood pressure at each visit. High blood pressure can lead to a stroke or other problems. Your provider may check for signs of heart disease, cancer, or other health problems.  How to lose weight safely:  A safe and healthy way to lose weight is to eat fewer calories and get regular exercise.  You can lose up about 1 pound a week by decreasing the number of calories you eat by 500 calories each day. You can decrease calories by eating smaller portion sizes or by cutting out high-calorie foods. Read labels to find out how many calories are in the foods you eat.         You can also burn calories with exercise such as walking, swimming, or biking. You will be more likely to keep weight off if you make these changes part of your lifestyle. Exercise at least 30 minutes per day on most days of the week. You can also fit in more physical activity by taking the stairs instead of the elevator or parking farther away from stores. Ask your healthcare provider about the best exercise plan for you.       Healthy meal plan for weight management:  A healthy meal plan includes a variety of foods, contains fewer calories, and helps you stay healthy. A healthy meal plan includes the following:     Eat whole-grain foods more often.  A healthy meal plan should contain fiber. Fiber is the part of grains, fruits, and vegetables that is not broken down by your body. Whole-grain foods are healthy and provide extra fiber in your diet. Some examples of whole-grain foods are whole-wheat breads and pastas, oatmeal, brown rice, and  bulgur.    Eat a variety of vegetables every day.  Include dark, leafy greens such as spinach, kale, bridget greens, and mustard greens. Eat yellow and orange vegetables such as carrots, sweet potatoes, and winter squash.     Eat a variety of fruits every day.  Choose fresh or canned fruit (canned in its own juice or light syrup) instead of juice. Fruit juice has very little or no fiber.    Eat low-fat dairy foods.  Drink fat-free (skim) milk or 1% milk. Eat fat-free yogurt and low-fat cottage cheese. Try low-fat cheeses such as mozzarella and other reduced-fat cheeses.    Choose meat and other protein foods that are low in fat.  Choose beans or other legumes such as split peas or lentils. Choose fish, skinless poultry (chicken or turkey), or lean cuts of red meat (beef or pork). Before you cook meat or poultry, cut off any visible fat.     Use less fat and oil.  Try baking foods instead of frying them. Add less fat, such as margarine, sour cream, regular salad dressing and mayonnaise to foods. Eat fewer high-fat foods. Some examples of high-fat foods include french fries, doughnuts, ice cream, and cakes.    Eat fewer sweets.  Limit foods and drinks that are high in sugar. This includes candy, cookies, regular soda, and sweetened drinks.  Ways to decrease calories:   Eat smaller portions.     Use a small plate with smaller servings.    Do not eat second helpings.    When you eat at a restaurant, ask for a box and place half of your meal in the box before you eat.    Share an entrée with someone else.    Replace high-calorie snacks with healthy, low-calorie snacks.     Choose fresh fruit, vegetables, fat-free rice cakes, or air-popped popcorn instead of potato chips, nuts, or chocolate.    Choose water or calorie-free drinks instead of soda or sweetened drinks.    Do not shop for groceries when you are hungry.  You may be more likely to make unhealthy food choices. Take a grocery list of healthy foods and shop after  you have eaten.    Eat regular meals. Do not skip meals. Skipping meals can lead to overeating later in the day. This can make it harder for you to lose weight. Eat a healthy snack in place of a meal if you do not have time to eat a regular meal. Talk with a dietitian to help you create a meal plan and schedule that is right for you.    Other things to consider as you try to lose weight:   Be aware of situations that may give you the urge to overeat, such as eating while watching television. Find ways to avoid these situations. For example, read a book, go for a walk, or do crafts.    Meet with a weight loss support group or friends who are also trying to lose weight. This may help you stay motivated to continue working on your weight loss goals.    © Copyright [employer] 2023 Information is for End User's use only and may not be sold, redistributed or otherwise used for commercial purposes.  The above information is an  only. It is not intended as medical advice for individual conditions or treatments. Talk to your doctor, nurse or pharmacist before following any medical regimen to see if it is safe and effective for you.    Cigarette Smoking and Your Health   AMBULATORY CARE:   Risks to your health if you smoke:  Nicotine and other chemicals found in tobacco and e-cigarettes can damage every cell in your body. Even if you are a light smoker, you have an increased risk for cancer, heart disease, and lung disease. If you are pregnant or have diabetes, smoking increases your risk for complications. Nicotine can affect an adolescent's developing brain. This can lead to trouble thinking, learning, or paying attention.  Benefits to your health if you stop smoking:   You decrease respiratory symptoms such as coughing, wheezing, and shortness of breath.    You reduce your risk for cancers of the lung, mouth, throat, kidney, bladder, pancreas, stomach, and cervix. If you already have cancer, you increase the  benefits of chemotherapy. You also reduce your risk for cancer returning or a second cancer from developing.    You reduce your risk for heart disease, blood clots, heart attack, and stroke.    You reduce your risk for lung infections, and diseases such as pneumonia, asthma, chronic bronchitis, and emphysema.    Your circulation improves. More oxygen can be delivered to your body. If you have diabetes, you lower your risk for complications, such as kidney, artery, and eye diseases. You also lower your risk for nerve damage. Nerve damage can lead to amputations, poor vision, and blindness.    You improve your body's ability to heal and to fight infections.    An adolescent can help his or her brain and body develop in a healthy way. Talk to your adolescent about all the health risks of nicotine. If you can, start talking about nicotine when your child is younger than 12 years. This may make it easier for him or her not to start using nicotine as a teenager or adult. Explain to him or her that it is best never to start. It can be hard to try to quit later.    Benefits to the health of others if you stop smoking:  Tobacco is harmful to nonsmokers who breathe in your secondhand smoke. The following are ways the health of others around you may improve when you stop smoking:  You lower the risks for lung cancer, heart disease, and stroke in nonsmoking adults.    If you are pregnant, you lower the risk for miscarriage, early delivery, low birth weight, and stillbirth. You also lower your baby's risk for SIDS, obesity, developmental delay, and neurobehavioral problems, such as ADHD.    If you have children, you lower their risk for ear infections, colds, pneumonia, bronchitis, and asthma.    Follow up with your doctor as directed:  Write down your questions so you remember to ask them during your visits.  For support and more information:   [employer]  [address] [place] [address]  Phone:  4- [phone] - [phone]  Phone: 9- 629 - [phone]  Web Address: www.lung.org    Smokefree.gov  Phone: 1- 866 - 364-6176  Web Address: www.smokefree.gov  © Copyright [employer] 2023 Information is for End User's use only and may not be sold, redistributed or otherwise used for commercial purposes.  The above information is an  only. It is not intended as medical advice for individual conditions or treatments. Talk to your doctor, nurse or pharmacist before following any medical regimen to see if it is safe and effective for you.    Cholesterol and Your Health   AMBULATORY CARE:   Cholesterol  is a waxy, fat-like substance. Your body uses cholesterol to make hormones and new cells, and to protect nerves. Cholesterol is made by your body. It also comes from certain foods you eat, such as meat and dairy products. Your healthcare provider can help you set goals for your cholesterol levels. Your provider can help you create a plan to meet your goals.  Cholesterol level goals:  Your cholesterol level goals depend on your risk for heart disease, your age, and your other health conditions. The following are general guidelines:  Total cholesterol  includes low-density lipoprotein (LDL), high-density lipoprotein (HDL), and triglyceride levels. The total cholesterol level should be lower than 200 mg/dL and is best at about 150 mg/dL.    LDL cholesterol  is called bad cholesterol  because it forms plaque in your arteries. As plaque builds up, your arteries become narrow, and less blood flows through. When plaque decreases blood flow to your heart, you may have chest pain. If plaque completely blocks an artery that brings blood to your heart, you may have a heart attack. Plaque can break off and form blood clots. Blood clots may block arteries in your brain and cause a stroke. The level should be less than 130 mg/dL and is best at about 100 mg/dL.         HDL cholesterol  is called good cholesterol  because it helps remove  LDL cholesterol from your arteries. It does this by attaching to LDL cholesterol and carrying it to your liver. Your liver breaks down LDL cholesterol so your body can get rid of it. High levels of HDL cholesterol can help prevent a heart attack and stroke. Low levels of HDL cholesterol can increase your risk for heart disease, heart attack, and stroke. The level should be at least 40 mg/dL in males or at least 50 mg/dL in females.    Triglycerides  are a type of fat that store energy from foods you eat. High levels of triglycerides also cause plaque buildup. This can increase your risk for a heart attack or stroke. If your triglyceride level is high, your LDL cholesterol level may also be high. The level should be less than 150 mg/dL.    Any of the following can increase your risk for high cholesterol:   Smoking or drinking large amounts of alcohol    Having overweight or obesity, or not getting enough exercise    A medical condition such as hypertension (high blood pressure) or diabetes    A family history of high cholesterol    Age older than 65    What you need to know about having your cholesterol levels checked:  Adults 20 to 45 years of age should have their cholesterol levels checked every 4 to 6 years. Adults 45 years or older should have their cholesterol checked every 1 to 2 years. You may need your cholesterol checked more often, or at a younger age, if you have risk factors for heart disease. You may also need to have your cholesterol checked more often if you have other health conditions, such as diabetes. Blood tests are used to check cholesterol levels. Blood tests measure your levels of triglycerides, LDL cholesterol, and HDL cholesterol.  How healthy fats affect your cholesterol levels:  Healthy fats, also called unsaturated fats, help lower LDL cholesterol and triglyceride levels. Healthy fats include the following:  Monounsaturated fats  are found in foods such as olive oil, canola oil, avocado,  nuts, and olives.    Polyunsaturated fats,  such as omega 3 fats, are found in fish, such as salmon, trout, and tuna. They can also be found in plant foods such as flaxseed, walnuts, and soybeans.    How unhealthy fats affect your cholesterol levels:  Unhealthy fats increase LDL cholesterol and triglyceride levels. They are found in foods high in cholesterol, saturated fat, and trans fat:  Cholesterol  is found in eggs, dairy, and meat.    Saturated fat  is found in butter, cheese, ice cream, whole milk, and coconut oil. Saturated fat is also found in meat, such as sausage, hot dogs, and bologna.    Trans fat  is found in liquid oils and is used in fried and baked foods. Foods that contain trans fats include chips, crackers, muffins, sweet rolls, microwave popcorn, and cookies.    Treatment  for high cholesterol will also decrease your risk of heart disease, heart attack, and stroke. Treatment may include any of the following:  Lifestyle changes  may include food, exercise, weight loss, and quitting smoking. You may also need to decrease the amount of alcohol you drink. Your healthcare provider will want you to start with lifestyle changes. Other treatment may be added if lifestyle changes are not enough. Your healthcare provider may recommend you work with a team to manage hyperlipidemia. The team may include medical experts such as a dietitian, an exercise or physical therapist, and a behavior therapist. Your family members may be included in helping you create lifestyle changes.    Medicines  may be given to lower your LDL cholesterol, triglyceride levels, or total cholesterol level. You may need medicines to lower your cholesterol if any of the following is true:    You have a history of stroke, TIA, unstable angina, or a heart attack.    Your LDL cholesterol level is 190 mg/dL or higher.    You are age 40 to 75 years, have diabetes or heart disease risk factors, and your LDL cholesterol is 70 mg/dL or  higher.    Supplements  include fish oil, red yeast rice, and garlic. Fish oil may help lower your triglyceride and LDL cholesterol levels. It may also increase your HDL cholesterol level. Red yeast rice may help decrease your total cholesterol level and LDL cholesterol level. Garlic may help lower your total cholesterol level. Do not take any supplements without talking to your healthcare provider.    Food changes you can make to lower your cholesterol levels:  A dietitian can help you create a healthy eating plan. Your dietitian can show you how to read food labels and choose foods low in saturated fat, trans fats, and cholesterol.     Decrease the total amount of fat you eat.  Choose lean meats, fat-free or 1% fat milk, and low-fat dairy products, such as yogurt and cheese. Try to limit or avoid red meats. Limit or do not eat fried foods or baked goods, such as cookies.    Replace unhealthy fats with healthy fats.  Cook foods in olive oil or canola oil. Choose soft margarines that are low in saturated fat and trans fat. Seeds, nuts, and avocados are other examples of healthy fats.    Eat foods with omega-3 fats.  Examples include salmon, tuna, mackerel, walnuts, and flaxseed. Eat fish 2 times per week. Pregnant women should not eat fish that have high levels of mercury, such as shark, swordfish, and tessy mackerel.         Increase the amount of high-fiber foods you eat.  High-fiber foods can help lower your LDL cholesterol. Aim to get between 20 and 30 grams of fiber each day. Fruits and vegetables are high in fiber. Eat at least 5 servings each day. Other high-fiber foods are whole-grain or whole-wheat breads, pastas, or cereals, and brown rice. Eat 3 ounces of whole-grain foods each day. Increase fiber slowly. You may have abdominal discomfort, bloating, and gas if you add fiber to your diet too quickly.         Eat healthy protein foods.  Examples include low-fat dairy products, skinless chicken and turkey,  fish, and nuts.    Limit foods and drinks that are high in sugar.  Your dietitian or healthcare provider can help you create daily limits for high-sugar foods and drinks. The limit may be lower if you have diabetes or another health condition. Limits can also help you lose weight if needed.  Lifestyle changes you can make to lower your cholesterol levels:   Maintain a healthy weight.  Ask your healthcare provider what a healthy weight is for you. Ask your provider to help you create a weight loss plan if needed. Weight loss can decrease your total cholesterol and triglyceride levels. Weight loss may also help keep your blood pressure at a healthy level.    Be physically active throughout the day.  Physical activity, such as exercise, can help lower your total cholesterol level and maintain a healthy weight. Physical activity can also help increase your HDL cholesterol level. Work with your healthcare provider to create an program that is right for you. Get at least 30 to 40 minutes of moderate physical activity most days of the week. Examples of exercise include brisk walking, swimming, or biking. Also include strength training at least 2 times each week. Your healthcare providers can help you create a physical activity plan.            Do not smoke.  Nicotine and other chemicals in cigarettes and cigars can raise your cholesterol levels. Ask your healthcare provider for information if you currently smoke and need help to quit. E-cigarettes or smokeless tobacco still contain nicotine. Talk to your healthcare provider before you use these products.         Limit or do not drink alcohol.  Alcohol can increase your triglyceride levels. Ask your healthcare provider before you drink alcohol. Ask how much is okay for you to drink in 24 hours or 1 week.    Follow up with your doctor as directed:  Write down your questions so you remember to ask them during your visits.  © Copyright [employer] 2023 Information is for End  User's use only and may not be sold, redistributed or otherwise used for commercial purposes.  The above information is an  only. It is not intended as medical advice for individual conditions or treatments. Talk to your doctor, nurse or pharmacist before following any medical regimen to see if it is safe and effective for you.

## 2024-05-31 NOTE — ASSESSMENT & PLAN NOTE
Blood pressure in the office today is 132/84.  Low-sodium diet recommended.  Patient was provided information regarding the DASH diet.

## 2024-05-31 NOTE — ASSESSMENT & PLAN NOTE
Patient with multiple issues including cervical radiculopathy, lumbar radiculopathy, lumbar disc herniation and lumbar spondylosis.  He continues on gabapentin with good relief of his discomfort.  He also is taking ibuprofen and Tylenol as needed.

## 2024-05-31 NOTE — PROGRESS NOTES
Adult Annual Physical  Name: Ronald Hart      : 1975      MRN: [id number]  Encounter Provider: ZACHERY Lopez  Encounter Date: 2024   Encounter department: [facility]    Assessment & Plan   1. Annual physical exam  2. Mixed hyperlipidemia  Assessment & Plan:  Surveillance blood work ordered.  Continue to follow low fat diet.  Orders:  -     Lipid Panel with Direct LDL reflex; Future  3. Elevated blood-pressure reading without diagnosis of hypertension  Assessment & Plan:  Blood pressure in the office today is 132/84.  Low-sodium diet recommended.  Patient was provided information regarding the DASH diet.  Orders:  -     CBC and differential; Future  -     Basic metabolic panel; Future  4. IFG (impaired fasting glucose)  Assessment & Plan:  Patient due for surveillance blood work.  The patient does watch his diet closely.  5. Chronic pain syndrome  Assessment & Plan:  Patient with multiple issues including cervical radiculopathy, lumbar radiculopathy, lumbar disc herniation and lumbar spondylosis.  He continues on gabapentin with good relief of his discomfort.  He also is taking ibuprofen and Tylenol as needed.    Immunizations and preventive care screenings were discussed with patient today. Appropriate education was printed on patient's after visit summary.      Counseling:  Alcohol/drug use: discussed moderation in alcohol intake, the recommendations for healthy alcohol use, and avoidance of illicit drug use.  Dental Health: discussed importance of regular tooth brushing, flossing, and dental visits.  Injury prevention: discussed safety/seat belts, safety helmets, smoke detectors, carbon dioxide detectors, and smoking near bedding or upholstery.  Sexual health: discussed sexually transmitted diseases, partner selection, use of condoms, avoidance of unintended pregnancy, and contraceptive alternatives.  Exercise: the importance of regular exercise/physical activity was  discussed. Recommend exercise 3-5 times per week for at least 30 minutes.       Depression Screening and Follow-up Plan: Patient was screened for depression during today's encounter. They screened negative with a PHQ-2 score of 0.        History of Present Illness   {Disappearing Hyperlinks I Encounters * My Last Note * Since Last Visit * History :49576}  Adult Annual Physical:  Patient presents for annual physical.     Diet and Physical Activity:  - Diet/Nutrition: well balanced diet and consuming 3-5 servings of fruits/vegetables daily.  - Exercise: no formal exercise. active job    Depression Screening:  - PHQ-2 Score: 0    General Health:  - Sleep: sleeps well and 4-6 hours of sleep on average.  - Hearing: normal hearing bilateral ears.  - Vision: wears glasses and most recent eye exam > 1 year ago.  - Dental: brushes teeth twice daily and regular dental visits.    /GYN Health:    - History of STDs: no     Health:  - History of STDs: no.     Advanced Care Planning:  - Has an advanced directive?: no    - Has a durable medical POA?: no    - ACP document given to patient?: no      Review of Systems   Constitutional: Negative.  Negative for fatigue.   HENT: Negative.  Negative for congestion, postnasal drip, rhinorrhea and trouble swallowing.    Eyes: Negative.  Negative for visual disturbance.   Respiratory: Negative.  Negative for choking and shortness of breath.    Cardiovascular: Negative.  Negative for chest pain.   Gastrointestinal: Negative.    Endocrine: Negative.    Genitourinary: Negative.    Musculoskeletal:  Positive for arthralgias (chronic). Negative for back pain, myalgias and neck pain.   Skin: Negative.    Neurological:  Negative for dizziness and headaches.   Psychiatric/Behavioral: Negative.       Current Outpatient Medications on File Prior to Visit   Medication Sig Dispense Refill   • acetaminophen (TYLENOL) 500 mg tablet Take 500 mg by mouth every 6 (six) hours as needed for mild pain     •  gabapentin (NEURONTIN) 600 MG tablet Take 1.5 PO AM and 2 PO  tablet 1   • ibuprofen (MOTRIN) 200 mg tablet Take by mouth every 6 (six) hours as needed for mild pain       No current facility-administered medications on file prior to visit.      Social History     Tobacco Use   • Smoking status: Former     Types: Cigars, Cigarettes     Passive exposure: Never   • Smokeless tobacco: Never   • Tobacco comments:     quit 200, smoke cigars occasionally    Vaping Use   • Vaping status: Never Used   Substance and Sexual Activity   • Alcohol use: Yes   • Drug use: Not Currently   • Sexual activity: Not on file       Objective   {Disappearing Hyperlinks   Review Vitals * Enter New Vitals * Results Review * Labs * Imaging * Cardiology * Procedures * Lung Cancer Screening :48921}  /84   Pulse 86   Temp (!) 97.3 °F (36.3 °C) (Temporal)   Resp 18   Ht 5' (1.524 m)   Wt 73 kg (161 lb)   SpO2 97%   BMI 31.44 kg/m²     Physical Exam  Vitals and nursing note reviewed.   Constitutional:       Appearance: Normal appearance. He is well-developed and normal weight.   HENT:      Head: Normocephalic and atraumatic.      Right Ear: Tympanic membrane, ear canal and external ear normal. There is no impacted cerumen.      Left Ear: Tympanic membrane, ear canal and external ear normal. There is no impacted cerumen.      Nose: Nose normal.      Mouth/Throat:      Mouth: Mucous membranes are moist.      Pharynx: Oropharynx is clear.   Eyes:      Conjunctiva/sclera: Conjunctivae normal.   Cardiovascular:      Rate and Rhythm: Normal rate and regular rhythm.      Pulses: Normal pulses.      Heart sounds: Normal heart sounds. No murmur heard.  Pulmonary:      Effort: Pulmonary effort is normal. No respiratory distress.      Breath sounds: Normal breath sounds.   Abdominal:      General: Bowel sounds are normal. There is no distension.      Palpations: Abdomen is soft. There is no mass.      Tenderness: There is no abdominal  tenderness. There is no guarding or rebound.      Hernia: No hernia is present.   Musculoskeletal:         General: Normal range of motion.      Cervical back: Normal range of motion and neck supple.   Skin:     General: Skin is warm and dry.   Neurological:      General: No focal deficit present.      Mental Status: He is alert and oriented to person, place, and time. Mental status is at baseline.   Psychiatric:         Mood and Affect: Mood normal.         Behavior: Behavior normal.         Thought Content: Thought content normal.         Judgment: Judgment normal.       Administrative Statements {Disappearing Hyperlinks I  [id number]}

## 2024-06-17 ENCOUNTER — APPOINTMENT (OUTPATIENT)
Dept: LAB | Facility: CLINIC | Age: 49
End: 2024-06-17
Payer: COMMERCIAL

## 2024-06-17 DIAGNOSIS — E78.2 MIXED HYPERLIPIDEMIA: ICD-10-CM

## 2024-06-17 DIAGNOSIS — R03.0 ELEVATED BLOOD-PRESSURE READING WITHOUT DIAGNOSIS OF HYPERTENSION: ICD-10-CM

## 2024-06-17 LAB
ANION GAP SERPL CALCULATED.3IONS-SCNC: 5 MMOL/L (ref 4–13)
BASOPHILS # BLD AUTO: 0.04 THOUSANDS/ÂΜL (ref 0–0.1)
BASOPHILS NFR BLD AUTO: 1 % (ref 0–1)
BUN SERPL-MCNC: 23 MG/DL (ref 5–25)
CALCIUM SERPL-MCNC: 9.1 MG/DL (ref 8.4–10.2)
CHLORIDE SERPL-SCNC: 104 MMOL/L (ref 96–108)
CHOLEST SERPL-MCNC: 262 MG/DL
CO2 SERPL-SCNC: 29 MMOL/L (ref 21–32)
CREAT SERPL-MCNC: 0.91 MG/DL (ref 0.6–1.3)
EOSINOPHIL # BLD AUTO: 0.06 THOUSAND/ÂΜL (ref 0–0.61)
EOSINOPHIL NFR BLD AUTO: 2 % (ref 0–6)
ERYTHROCYTE [DISTWIDTH] IN BLOOD BY AUTOMATED COUNT: 13 % (ref 11.6–15.1)
GFR SERPL CREATININE-BSD FRML MDRD: 99 ML/MIN/1.73SQ M
GLUCOSE P FAST SERPL-MCNC: 103 MG/DL (ref 65–99)
HCT VFR BLD AUTO: 40.9 % (ref 36.5–49.3)
HDLC SERPL-MCNC: 73 MG/DL
HGB BLD-MCNC: 13.2 G/DL (ref 12–17)
IMM GRANULOCYTES # BLD AUTO: 0.01 THOUSAND/UL (ref 0–0.2)
IMM GRANULOCYTES NFR BLD AUTO: 0 % (ref 0–2)
LDLC SERPL CALC-MCNC: 177 MG/DL (ref 0–100)
LYMPHOCYTES # BLD AUTO: 1.16 THOUSANDS/ÂΜL (ref 0.6–4.47)
LYMPHOCYTES NFR BLD AUTO: 31 % (ref 14–44)
MCH RBC QN AUTO: 31.2 PG (ref 26.8–34.3)
MCHC RBC AUTO-ENTMCNC: 32.3 G/DL (ref 31.4–37.4)
MCV RBC AUTO: 97 FL (ref 82–98)
MONOCYTES # BLD AUTO: 0.42 THOUSAND/ÂΜL (ref 0.17–1.22)
MONOCYTES NFR BLD AUTO: 11 % (ref 4–12)
NEUTROPHILS # BLD AUTO: 2.01 THOUSANDS/ÂΜL (ref 1.85–7.62)
NEUTS SEG NFR BLD AUTO: 55 % (ref 43–75)
NRBC BLD AUTO-RTO: 0 /100 WBCS
PLATELET # BLD AUTO: 258 THOUSANDS/UL (ref 149–390)
PMV BLD AUTO: 9.9 FL (ref 8.9–12.7)
POTASSIUM SERPL-SCNC: 4.4 MMOL/L (ref 3.5–5.3)
RBC # BLD AUTO: 4.23 MILLION/UL (ref 3.88–5.62)
SODIUM SERPL-SCNC: 138 MMOL/L (ref 135–147)
TRIGL SERPL-MCNC: 61 MG/DL
WBC # BLD AUTO: 3.7 THOUSAND/UL (ref 4.31–10.16)

## 2024-06-17 PROCEDURE — 36415 COLL VENOUS BLD VENIPUNCTURE: CPT

## 2024-06-17 PROCEDURE — 85025 COMPLETE CBC W/AUTO DIFF WBC: CPT

## 2024-06-17 PROCEDURE — 80061 LIPID PANEL: CPT

## 2024-06-17 PROCEDURE — 80048 BASIC METABOLIC PNL TOTAL CA: CPT

## 2024-09-05 DIAGNOSIS — M54.16 LUMBAR RADICULOPATHY: ICD-10-CM

## 2024-09-05 RX ORDER — GABAPENTIN 600 MG/1
TABLET ORAL
Qty: 315 TABLET | Refills: 0 | Status: SHIPPED | OUTPATIENT
Start: 2024-09-05

## 2024-09-05 NOTE — TELEPHONE ENCOUNTER
Reason for call:   [x] Refill   [] Prior Auth  [] Other:     Office:   [] PCP/Provider -   [x] Specialty/Provider -     Medication:         Pharmacy: [facility]     Does the patient have enough for 3 days?   [x] Yes   [] No - Send as HP to POD

## 2024-12-10 DIAGNOSIS — M54.16 LUMBAR RADICULOPATHY: ICD-10-CM

## 2024-12-10 RX ORDER — GABAPENTIN 600 MG/1
TABLET ORAL
Qty: 315 TABLET | Refills: 0 | Status: SHIPPED | OUTPATIENT
Start: 2024-12-10

## 2024-12-10 NOTE — TELEPHONE ENCOUNTER
S/w pt who is requesting a refill of gabapentin 600 mg. Pt takes 2  in the am and 1.5 at HS.  Pt reports they provide relief and he has no se's.  Pt only requires cb if issue with refill.    LP 2/20/24 #315 with no  refills at [facility]  Pt will call to schedule appt.

## 2025-02-10 NOTE — PROGRESS NOTES
Assessment:  1. Lumbar radiculopathy    2. Bilateral foot pain    3. Lumbar spondylosis    4. Chronic pain syndrome    5. Spinal stenosis of lumbar region with neurogenic claudication        Plan:  We can repeat bilateral L5 TFESI as needed.  Patient maintaining gabapentin as prescribed.  This medication was refilled today  Medical marijuana information provided to patient as requested  Patient may continue with Tylenol and over-the-counter ibuprofen as needed  Continue home exercise program  Follow-up in 6 months or sooner if needed    History of Present Illness:    The patient is a 49 y.o. male last seen on 2/20/2024 who presents for a follow up office visit in regards to chronic low back pain that will radiate into the right thigh and bilateral feet.  He prioritizes his foot pain as most bothersome, especially during the cold weather working outside in steel-tip boots. He denies bowel or bladder incontinence or saddle anesthesia.  He has seen podiatry for his foot pain.  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  He has had a bilateral L5 TFESI 2021 which she states did definitely improve his back and leg symptoms.  He feels like it also improved some of his foot symptoms as well.  He continues on gabapentin 900 mg in the morning and 1200 mg at bedtime, Tylenol as needed and ibuprofen with 70% improvement of his pain.  He is inquiring about medicinal cannabis.    Patient rates his pain a 4 out of 10 on the numeric pain rating scale.  Pain is intermittent and described as burning, dull aching, pressure-like and pins-and-needles    I have personally reviewed and/or updated the patient's past medical history, past surgical history, family history, social history, current medications, allergies, and vital signs today.       Review of Systems:    Review of Systems   Respiratory:  Negative for shortness of breath.    Cardiovascular:  Negative for chest pain.   Gastrointestinal:  Negative for constipation, diarrhea,  nausea and vomiting.   Musculoskeletal:  Negative for arthralgias, gait problem, joint swelling and myalgias.   Skin:  Negative for rash.   Neurological:  Negative for dizziness, seizures and weakness.   All other systems reviewed and are negative.        Past Medical History:   Diagnosis Date    Hypertension        History reviewed. No pertinent surgical history.    Family History   Problem Relation Age of Onset    Hypertension Mother     Hypertension Father     No Known Problems Sister     Hypertension Brother        Social History     Occupational History    Not on file   Tobacco Use    Smoking status: Former     Types: Cigars, Cigarettes     Passive exposure: Never    Smokeless tobacco: Never    Tobacco comments:     quit 200, smoke cigars occasionally    Vaping Use    Vaping status: Never Used   Substance and Sexual Activity    Alcohol use: Yes    Drug use: Not Currently    Sexual activity: Not on file         Current Outpatient Medications:     acetaminophen (TYLENOL) 500 mg tablet, Take 500 mg by mouth every 6 (six) hours as needed for mild pain, Disp: , Rfl:     gabapentin (NEURONTIN) 600 MG tablet, Take 1.5 PO AM and 2 PO HS, Disp: 315 tablet, Rfl: 1    ibuprofen (MOTRIN) 200 mg tablet, Take by mouth every 6 (six) hours as needed for mild pain, Disp: , Rfl:     No Known Allergies    Physical Exam:    Ht 5' (1.524 m)   Wt 73 kg (161 lb)   BMI 31.44 kg/m²     Constitutional:normal, well developed, well nourished, alert, in no distress and non-toxic and no overt pain behavior.  Eyes:anicteric  HEENT:grossly intact  Neck:supple, symmetric, trachea midline and no masses   Pulmonary:even and unlabored  Cardiovascular:No edema or pitting edema present  Skin:Normal without rashes or lesions and well hydrated  Psychiatric:Mood and affect appropriate  Neurologic:Cranial Nerves II-XII grossly intact  Musculoskeletal:normal gait      Imaging  No orders to display         No orders of the defined types were placed in  this encounter.

## 2025-02-11 ENCOUNTER — OFFICE VISIT (OUTPATIENT)
Dept: PAIN MEDICINE | Facility: CLINIC | Age: 50
End: 2025-02-11
Payer: COMMERCIAL

## 2025-02-11 VITALS — WEIGHT: 161 LBS | BODY MASS INDEX: 31.61 KG/M2 | HEIGHT: 60 IN

## 2025-02-11 DIAGNOSIS — M54.16 LUMBAR RADICULOPATHY: Primary | ICD-10-CM

## 2025-02-11 DIAGNOSIS — M79.672 BILATERAL FOOT PAIN: ICD-10-CM

## 2025-02-11 DIAGNOSIS — M48.062 SPINAL STENOSIS OF LUMBAR REGION WITH NEUROGENIC CLAUDICATION: ICD-10-CM

## 2025-02-11 DIAGNOSIS — M79.671 BILATERAL FOOT PAIN: ICD-10-CM

## 2025-02-11 DIAGNOSIS — G89.4 CHRONIC PAIN SYNDROME: ICD-10-CM

## 2025-02-11 DIAGNOSIS — M47.816 LUMBAR SPONDYLOSIS: ICD-10-CM

## 2025-02-11 PROCEDURE — 99214 OFFICE O/P EST MOD 30 MIN: CPT | Performed by: NURSE PRACTITIONER

## 2025-02-11 RX ORDER — GABAPENTIN 600 MG/1
TABLET ORAL
Qty: 315 TABLET | Refills: 1 | Status: SHIPPED | OUTPATIENT
Start: 2025-02-11

## 2025-04-28 NOTE — TELEPHONE ENCOUNTER
1 [id number] 03/24/2025 03/24/2025 ALPRAZolam (Tablet) 30.0 30 0.5 MG NA LATONIA [employer] 0 / 0 [place]    1 [id number] 02/24/2025 02/24/2025 ALPRAZolam (Tablet) 30.0 30 0.5 MG NA MELISSA [employer] Commercial [employer] 0 / 0 [place]    1 [id number] 01/21/2025 01/21/2025 ALPRAZolam (Tablet) 30.0 30 0.5 MG NA MELISSA [employer] Commercial [employer] 0 / 0 [place]    1 [id number] 12/23/2024 12/23/2024 ALPRAZolam (Tablet) 30.0 30 0.5 MG NA ELSPETH BLACK-LOPEZ [employer] Commercial [employer] 0 / 0 [place]    1 [id number] 11/19/2024 11/19/2024 ALPRAZolam (Tablet) 30.0 30 0.5 MG NA MELISSA [employer] 0 / 0 [place]    1 [id number] 10/16/2024 10/16/2024 ALPRAZolam (Tablet) 30.0 30 0.5 MG NA MELISSA [employer] 0 / 0 [place]    1 [id number] 09/18/2024 09/18/2024 ALPRAZolam (Tablet) 30.0 30 0.5 MG NA MELISSA [employer] 0 / 0 [place]    1 [id number] 08/19/2024 08/19/2024 ALPRAZolam (Tablet) 30.0 30 0.5 MG NA LATONIA JOSIAH [employer] 0 / 0 [place]    1 [id number] 07/19/2024 07/19/2024 ALPRAZolam (Tablet) 30.0 30 0.5 MG NA MELISSA [employer] 0 / 0 [place]    1 [id number] 06/18/2024 06/18/2024 ALPRAZolam (Tablet) 30.0 30 0.5 MG NA MELISSA [employer] 0 / 0        S/W pt.  Pt reports will need a refill.  Pt went back to work full time.  Medication is working with no side effects.  Pt has about two weeks left of pills.

## 2025-05-30 ENCOUNTER — RA CDI HCC (OUTPATIENT)
Dept: OTHER | Facility: HOSPITAL | Age: 50
End: 2025-05-30

## 2025-06-26 ENCOUNTER — OFFICE VISIT (OUTPATIENT)
Dept: FAMILY MEDICINE CLINIC | Facility: CLINIC | Age: 50
End: 2025-06-26
Payer: COMMERCIAL

## 2025-06-26 VITALS
HEART RATE: 68 BPM | OXYGEN SATURATION: 98 % | HEIGHT: 67 IN | BODY MASS INDEX: 26.65 KG/M2 | SYSTOLIC BLOOD PRESSURE: 158 MMHG | DIASTOLIC BLOOD PRESSURE: 96 MMHG | WEIGHT: 169.8 LBS

## 2025-06-26 DIAGNOSIS — R03.0 ELEVATED BLOOD-PRESSURE READING WITHOUT DIAGNOSIS OF HYPERTENSION: Primary | ICD-10-CM

## 2025-06-26 DIAGNOSIS — R33.9 INCOMPLETE BLADDER EMPTYING: ICD-10-CM

## 2025-06-26 DIAGNOSIS — E78.2 MIXED HYPERLIPIDEMIA: ICD-10-CM

## 2025-06-26 DIAGNOSIS — M54.16 LUMBAR RADICULOPATHY: ICD-10-CM

## 2025-06-26 DIAGNOSIS — N39.43 POST-VOID DRIBBLING: ICD-10-CM

## 2025-06-26 DIAGNOSIS — R73.01 IFG (IMPAIRED FASTING GLUCOSE): ICD-10-CM

## 2025-06-26 DIAGNOSIS — Z00.00 ROUTINE PHYSICAL EXAMINATION: ICD-10-CM

## 2025-06-26 PROCEDURE — 99214 OFFICE O/P EST MOD 30 MIN: CPT | Performed by: NURSE PRACTITIONER

## 2025-06-26 PROCEDURE — 99396 PREV VISIT EST AGE 40-64: CPT | Performed by: NURSE PRACTITIONER

## 2025-06-26 NOTE — ASSESSMENT & PLAN NOTE
Lipid panel ordered  Follow a low fat/ low cholesterol diet   Orders:    Hemoglobin A1C; Future    Lipid panel; Future

## 2025-06-26 NOTE — ASSESSMENT & PLAN NOTE
BP is consistently elevated during office visits.  On retake via myself, BP at 158/96.  Pt is asymptomatic.  He refuses antihypertensive agents at this time and prefers to work on lifestyle modifications.  Reviewed the risks of this today.  He will monitor his BP daily and send me readings in one week.  Follow a low sodium diet, limit caffeine intake.  Lab work as ordered   Orders:    CBC and differential; Future    Comprehensive metabolic panel; Future    Hemoglobin A1C; Future    TSH, 3rd generation with Free T4 reflex; Future

## 2025-06-26 NOTE — ASSESSMENT & PLAN NOTE
Updated blood work ordered today  Follow a low carb/ low sugar diet   Orders:    Hemoglobin A1C; Future

## 2025-06-26 NOTE — PROGRESS NOTES
"Adult Annual Physical  Name: Ronald Hart      : 1975      MRN: [id number]  Encounter Provider: ZACHERY Ruff  Encounter Date: 2025   Encounter department: [facility]    :  Assessment & Plan  Routine physical examination  Cologuard negative from 2023  Lab work as ordered today        Elevated blood-pressure reading without diagnosis of hypertension  BP is consistently elevated during office visits.  On retake via myself, BP at 158/96.  Pt is asymptomatic.  He refuses antihypertensive agents at this time and prefers to work on lifestyle modifications.  Reviewed the risks of this today.  He will monitor his BP daily and send me readings in one week.  Follow a low sodium diet, limit caffeine intake.  Lab work as ordered   Orders:    CBC and differential; Future    Comprehensive metabolic panel; Future    Hemoglobin A1C; Future    TSH, 3rd generation with Free T4 reflex; Future    IFG (impaired fasting glucose)  Updated blood work ordered today  Follow a low carb/ low sugar diet   Orders:    Hemoglobin A1C; Future     Mixed hyperlipidemia  Lipid panel ordered  Follow a low fat/ low cholesterol diet   Orders:    Hemoglobin A1C; Future    Lipid panel; Future     Incomplete bladder emptying  Reviewed symptoms of BPH today with patient  He notes he manually manipulates himself to allow for the \"last few drops of urine\" to be emptied.  The can't recall how long he has been dong this.  Does this with almost every urination.  If he does not do this, he has post-void dribbling.  No trouble with initiating or maintaining the flow of urine. He had a grandfather with Prostate cancer in his 80s or 90s. PSA and UA ordered today.  Referred patient to Urology for further evaluation   Orders:    PSA, Total Screen; Future    Ambulatory Referral to Urology; Future    UA w Reflex to Microscopic w Reflex to Culture; Future    Post-void dribbling    Orders:    PSA, Total Screen; Future    " Ambulatory Referral to Urology; Future    UA w Reflex to Microscopic w Reflex to Culture; Future    Lumbar radiculopathy  Stable and managed by Pain Management              Annual Physical Plan    Depression Screening and Follow-up Plan: Patient was screened for depression during today's encounter. They screened negative with a PHQ-2 score of 0.          History of Present Illness   Pt presents today for a routine physical and follow up    Quit smoking 20 years ago, smoked for 10-15 years, smoked 1 ppd for about 10 years     BP elevated today-- pt notes his BP is always elevated in any doctor's office   Had 40 oz of coffee this morning, rushed to get here  He does not check BP at home but he does have an automatic cuff   Denies family h/o CV disease  Tries to be mindful of sodium intake   No CP, palpitations, dizziness, SOB, headaches, facial flushing   He does note some mild B/L LE edema  Pt notes he tends to be an anxious individual, he feels he has undiagnosed ADHD but does not want to see anyone for this/ treat this via medication.  He has seen a therapist in the past but doesn't feel this is necessary at this time.  Denies depression     Continues to follow with Pain Management for lumbar radiculopathy       Adult Annual Physical:  Patient presents for annual physical.     Diet and Physical Activity:  - Diet/Nutrition: no special diet.  - Exercise: walking.    Depression Screening:  - PHQ-2 Score: 0    General Health:  - Sleep: sleeps poorly. no trouble falling asleep, but wakes up frequently and starts thinking about various things-- he notes this is not bothersome to him and he still feels well rested  - Hearing: decreased hearing bilateral ears. has had formal hearing tests previously which were normal  - Vision: wears glasses, most recent eye exam < 1 year ago and no vision problems.  - Dental: regular dental visits.     Health:    - Urinary symptoms: post-void dribbling and incomplete bladder emptying.  "    Review of Systems   Constitutional:  Negative for activity change, appetite change, chills, diaphoresis, fatigue, fever and unexpected weight change.   Eyes:  Negative for visual disturbance.   Respiratory:  Negative for cough, chest tightness, shortness of breath and wheezing.    Cardiovascular:  Negative for chest pain, palpitations and leg swelling.   Gastrointestinal:  Negative for abdominal pain, blood in stool, constipation, diarrhea and nausea.   Genitourinary:  Negative for dysuria.   Musculoskeletal:  Positive for back pain. Negative for arthralgias, gait problem, joint swelling and myalgias.   Skin:  Negative for rash and wound.   Neurological:  Positive for numbness (B/L feet when lumbar radiculopathy is flared up). Negative for dizziness, weakness and headaches.   Psychiatric/Behavioral:  Positive for sleep disturbance. Negative for dysphoric mood, self-injury and suicidal ideas. The patient is nervous/anxious and is hyperactive.          Objective   /96 Comment: manual left arm, CM CRNP  Pulse 68   Ht 5' 7\" (1.702 m)   Wt 77 kg (169 lb 12.8 oz)   SpO2 98%   BMI 26.59 kg/m²     Physical Exam  Vitals reviewed.   Constitutional:       General: He is not in acute distress.     Appearance: Normal appearance. He is not ill-appearing, toxic-appearing or diaphoretic.   HENT:      Head: Normocephalic and atraumatic.      Right Ear: Tympanic membrane normal.      Left Ear: Tympanic membrane normal.      Nose: Nose normal.      Mouth/Throat:      Mouth: Mucous membranes are moist.      Pharynx: Oropharynx is clear.     Eyes:      Extraocular Movements: Extraocular movements intact.      Conjunctiva/sclera: Conjunctivae normal.      Pupils: Pupils are equal, round, and reactive to light.     Neck:      Vascular: No carotid bruit.     Cardiovascular:      Rate and Rhythm: Normal rate and regular rhythm.      Pulses: Normal pulses.      Heart sounds: Normal heart sounds. No murmur heard.  Pulmonary:    "   Effort: Pulmonary effort is normal. No respiratory distress.      Breath sounds: Normal breath sounds. No wheezing.   Abdominal:      General: Bowel sounds are normal. There is no distension.      Palpations: Abdomen is soft.      Tenderness: There is no abdominal tenderness.     Musculoskeletal:         General: Normal range of motion.      Cervical back: Normal range of motion and neck supple. No rigidity. No muscular tenderness.   Lymphadenopathy:      Cervical: No cervical adenopathy.     Skin:     General: Skin is warm and dry.      Capillary Refill: Capillary refill takes less than 2 seconds.      Findings: No bruising, erythema or rash.     Neurological:      General: No focal deficit present.      Mental Status: He is alert and oriented to person, place, and time.      Cranial Nerves: No cranial nerve deficit.     Psychiatric:         Mood and Affect: Mood normal.         Behavior: Behavior normal.         Thought Content: Thought content normal.         Judgment: Judgment normal.

## 2025-07-01 ENCOUNTER — TELEPHONE (OUTPATIENT)
Age: 50
End: 2025-07-01

## 2025-07-01 ENCOUNTER — APPOINTMENT (OUTPATIENT)
Dept: LAB | Facility: CLINIC | Age: 50
End: 2025-07-01
Payer: COMMERCIAL

## 2025-07-01 DIAGNOSIS — E78.2 MIXED HYPERLIPIDEMIA: ICD-10-CM

## 2025-07-01 DIAGNOSIS — R33.9 INCOMPLETE BLADDER EMPTYING: ICD-10-CM

## 2025-07-01 DIAGNOSIS — R03.0 ELEVATED BLOOD-PRESSURE READING WITHOUT DIAGNOSIS OF HYPERTENSION: ICD-10-CM

## 2025-07-01 DIAGNOSIS — N39.43 POST-VOID DRIBBLING: ICD-10-CM

## 2025-07-01 DIAGNOSIS — R73.01 IFG (IMPAIRED FASTING GLUCOSE): ICD-10-CM

## 2025-07-01 LAB
ALBUMIN SERPL BCG-MCNC: 4.6 G/DL (ref 3.5–5)
ALP SERPL-CCNC: 44 U/L (ref 34–104)
ALT SERPL W P-5'-P-CCNC: 20 U/L (ref 7–52)
ANION GAP SERPL CALCULATED.3IONS-SCNC: 10 MMOL/L (ref 4–13)
AST SERPL W P-5'-P-CCNC: 19 U/L (ref 13–39)
BASOPHILS # BLD AUTO: 0.05 THOUSANDS/ÂΜL (ref 0–0.1)
BASOPHILS NFR BLD AUTO: 1 % (ref 0–1)
BILIRUB SERPL-MCNC: 0.69 MG/DL (ref 0.2–1)
BILIRUB UR QL STRIP: NEGATIVE
BUN SERPL-MCNC: 22 MG/DL (ref 5–25)
CALCIUM SERPL-MCNC: 9.3 MG/DL (ref 8.4–10.2)
CHLORIDE SERPL-SCNC: 103 MMOL/L (ref 96–108)
CHOLEST SERPL-MCNC: 262 MG/DL (ref ?–200)
CLARITY UR: CLEAR
CO2 SERPL-SCNC: 27 MMOL/L (ref 21–32)
COLOR UR: NORMAL
CREAT SERPL-MCNC: 0.84 MG/DL (ref 0.6–1.3)
EOSINOPHIL # BLD AUTO: 0.11 THOUSAND/ÂΜL (ref 0–0.61)
EOSINOPHIL NFR BLD AUTO: 3 % (ref 0–6)
ERYTHROCYTE [DISTWIDTH] IN BLOOD BY AUTOMATED COUNT: 13.2 % (ref 11.6–15.1)
EST. AVERAGE GLUCOSE BLD GHB EST-MCNC: 117 MG/DL
GFR SERPL CREATININE-BSD FRML MDRD: 102 ML/MIN/1.73SQ M
GLUCOSE P FAST SERPL-MCNC: 111 MG/DL (ref 65–99)
GLUCOSE UR STRIP-MCNC: NEGATIVE MG/DL
HBA1C MFR BLD: 5.7 %
HCT VFR BLD AUTO: 42.3 % (ref 36.5–49.3)
HDLC SERPL-MCNC: 74 MG/DL
HGB BLD-MCNC: 13.8 G/DL (ref 12–17)
HGB UR QL STRIP.AUTO: NEGATIVE
IMM GRANULOCYTES # BLD AUTO: 0.01 THOUSAND/UL (ref 0–0.2)
IMM GRANULOCYTES NFR BLD AUTO: 0 % (ref 0–2)
KETONES UR STRIP-MCNC: NEGATIVE MG/DL
LDLC SERPL CALC-MCNC: 178 MG/DL (ref 0–100)
LEUKOCYTE ESTERASE UR QL STRIP: NEGATIVE
LYMPHOCYTES # BLD AUTO: 1 THOUSANDS/ÂΜL (ref 0.6–4.47)
LYMPHOCYTES NFR BLD AUTO: 23 % (ref 14–44)
MCH RBC QN AUTO: 31.1 PG (ref 26.8–34.3)
MCHC RBC AUTO-ENTMCNC: 32.6 G/DL (ref 31.4–37.4)
MCV RBC AUTO: 95 FL (ref 82–98)
MONOCYTES # BLD AUTO: 0.48 THOUSAND/ÂΜL (ref 0.17–1.22)
MONOCYTES NFR BLD AUTO: 11 % (ref 4–12)
NEUTROPHILS # BLD AUTO: 2.71 THOUSANDS/ÂΜL (ref 1.85–7.62)
NEUTS SEG NFR BLD AUTO: 62 % (ref 43–75)
NITRITE UR QL STRIP: NEGATIVE
NONHDLC SERPL-MCNC: 188 MG/DL
NRBC BLD AUTO-RTO: 0 /100 WBCS
PH UR STRIP.AUTO: 5.5 [PH]
PLATELET # BLD AUTO: 273 THOUSANDS/UL (ref 149–390)
PMV BLD AUTO: 10 FL (ref 8.9–12.7)
POTASSIUM SERPL-SCNC: 4 MMOL/L (ref 3.5–5.3)
PROT SERPL-MCNC: 7.2 G/DL (ref 6.4–8.4)
PROT UR STRIP-MCNC: NEGATIVE MG/DL
PSA SERPL-MCNC: 0.61 NG/ML (ref 0–4)
RBC # BLD AUTO: 4.44 MILLION/UL (ref 3.88–5.62)
SODIUM SERPL-SCNC: 140 MMOL/L (ref 135–147)
SP GR UR STRIP.AUTO: 1.02 (ref 1–1.03)
TRIGL SERPL-MCNC: 48 MG/DL (ref ?–150)
TSH SERPL DL<=0.05 MIU/L-ACNC: 1.16 UIU/ML (ref 0.45–4.5)
UROBILINOGEN UR STRIP-ACNC: <2 MG/DL
WBC # BLD AUTO: 4.36 THOUSAND/UL (ref 4.31–10.16)

## 2025-07-01 PROCEDURE — 36415 COLL VENOUS BLD VENIPUNCTURE: CPT

## 2025-07-01 PROCEDURE — 84443 ASSAY THYROID STIM HORMONE: CPT

## 2025-07-01 PROCEDURE — 80053 COMPREHEN METABOLIC PANEL: CPT

## 2025-07-01 PROCEDURE — 81003 URINALYSIS AUTO W/O SCOPE: CPT

## 2025-07-01 PROCEDURE — 80061 LIPID PANEL: CPT

## 2025-07-01 PROCEDURE — 83036 HEMOGLOBIN GLYCOSYLATED A1C: CPT

## 2025-07-01 PROCEDURE — G0103 PSA SCREENING: HCPCS

## 2025-07-01 PROCEDURE — 85025 COMPLETE CBC W/AUTO DIFF WBC: CPT

## 2025-07-01 NOTE — TELEPHONE ENCOUNTER
New Patient      Insurance: [employer] PPO  Current Insurance? yes    Insurance E-verified? yes  History:   Reason for appointment/active symptoms?     Post void dribbling/Incomplete bladder emptying  Has the patient had any previous Urologist(s)?    no  Was the patient seen in the ED? no     Labs/Imaging(Including Out Of Network)? no     Records Requested? no  Records Visible in EPIC? yes     Personal history of cancer? no     Appointment:   Office location preference: [place]   ?   Appointment Details:   Date:  08/13/25  Time: 7:40   Location:  [place]  Provider: Santiago Marie PA-C   Does the appointment need further review?no

## 2025-07-07 ENCOUNTER — RESULTS FOLLOW-UP (OUTPATIENT)
Dept: FAMILY MEDICINE CLINIC | Facility: CLINIC | Age: 50
End: 2025-07-07

## 2025-07-08 NOTE — TELEPHONE ENCOUNTER
Pt called back, relayed message from PCP. Pt verbalized understanding .. He would like to work on diet and exercise to lose some weight and bring his cholesterol down before starting on statin.

## 2025-07-29 ENCOUNTER — OFFICE VISIT (OUTPATIENT)
Dept: PAIN MEDICINE | Facility: CLINIC | Age: 50
End: 2025-07-29
Payer: COMMERCIAL

## 2025-07-29 VITALS — WEIGHT: 169 LBS | BODY MASS INDEX: 26.47 KG/M2

## 2025-07-29 DIAGNOSIS — G89.4 CHRONIC PAIN SYNDROME: ICD-10-CM

## 2025-07-29 DIAGNOSIS — M48.062 SPINAL STENOSIS OF LUMBAR REGION WITH NEUROGENIC CLAUDICATION: ICD-10-CM

## 2025-07-29 DIAGNOSIS — M47.816 LUMBAR SPONDYLOSIS: ICD-10-CM

## 2025-07-29 DIAGNOSIS — M54.16 LUMBAR RADICULOPATHY: Primary | ICD-10-CM

## 2025-07-29 PROCEDURE — 99214 OFFICE O/P EST MOD 30 MIN: CPT | Performed by: NURSE PRACTITIONER

## 2025-08-13 ENCOUNTER — CONSULT (OUTPATIENT)
Dept: UROLOGY | Facility: AMBULATORY SURGERY CENTER | Age: 50
End: 2025-08-13
Payer: COMMERCIAL

## 2025-08-13 PROBLEM — N39.43 POST-VOID DRIBBLING: Status: ACTIVE | Noted: 2025-08-13
